# Patient Record
Sex: MALE | Race: WHITE | Employment: FULL TIME | URBAN - METROPOLITAN AREA
[De-identification: names, ages, dates, MRNs, and addresses within clinical notes are randomized per-mention and may not be internally consistent; named-entity substitution may affect disease eponyms.]

---

## 2017-01-05 ENCOUNTER — TELEPHONE (OUTPATIENT)
Dept: CARDIAC REHAB | Age: 62
End: 2017-01-05

## 2017-01-05 NOTE — TELEPHONE ENCOUNTER
1/15/2017 Cardiac Wellness: Returned   Staci Rueda's call. At his request scheduled an intake for Thursday, 1/19/17, at 3 pm. He would like to be moved up, if an appointment becomes available.  Chucho Calix RN

## 2017-01-09 ENCOUNTER — TELEPHONE (OUTPATIENT)
Dept: CASE MANAGEMENT | Age: 62
End: 2017-01-09

## 2017-01-09 ENCOUNTER — OFFICE VISIT (OUTPATIENT)
Dept: CARDIOTHORACIC SURGERY | Age: 62
End: 2017-01-09

## 2017-01-09 VITALS
HEART RATE: 82 BPM | HEIGHT: 74 IN | TEMPERATURE: 99 F | SYSTOLIC BLOOD PRESSURE: 108 MMHG | BODY MASS INDEX: 24.05 KG/M2 | OXYGEN SATURATION: 98 % | WEIGHT: 187.4 LBS | DIASTOLIC BLOOD PRESSURE: 82 MMHG | RESPIRATION RATE: 16 BRPM

## 2017-01-09 DIAGNOSIS — Z95.2 S/P AVR: Primary | ICD-10-CM

## 2017-01-09 DIAGNOSIS — I71.21 ASCENDING AORTIC ANEURYSM: ICD-10-CM

## 2017-01-09 NOTE — TELEPHONE ENCOUNTER
Cardiac surgery Care Coordinator- Received phone call from [employer]. Mr Carla Martinez is interesting in moving up his Cardiac Rehab intake appt. Spoke with [place] [facility] [place] in outpatient, they are attempting to move appt up. Left voicemail message for Mr Carla Martinez conveying information. Provided outpt number for Cardiac Rehab.  Lary Lanier RN

## 2017-01-09 NOTE — PROGRESS NOTES
..Patient seen and documentation reviewed  Doing well without specific complaints  I reviewed post op recommendations and importance of cardiology and PCP follow up  Will call office with any further questions  Will see him in one year w CT scan

## 2017-01-09 NOTE — PROGRESS NOTES
Patient: Karissa Browne   Age: 57 y.o. Patient Care Team:  Woody Jimenez MD as PCP - General (Family Practice)  Lexie Humphrey MD as Physician (Internal Medicine)  Valerio Lyn MD as Surgeon (Cardiothoracic Surgery)  Rui Woodall MD (Cardiology)    Diagnosis: The primary encounter diagnosis was S/P AVR. A diagnosis of Ascending aortic aneurysm (HCC) was also pertinent to this visit. Problem List:   Patient Active Problem List   Diagnosis Code    Nonrheumatic aortic valve stenosis I35.0    Ascending aortic aneurysm (HCC) I71.2    S/P AVR Z95.2        Date of Surgery: AVR tissue valve and repair of ascending aneurysm using hemashield graft on 12/12/16    HPI:  Pt is here for 1 month post op follow up. Pt not feeling the best, s/t soreness. Has had to increase ibuprofen, and is considering adding some tramadol at night. Feels pain in sternum and in back and down arms. Starting cardiac rehab tomorrow. Denies CP, SOB, edema, dizziness. Lightheadedness resolved. Appetite good, bowels regularly. No nose bleeds, but some blood noted in mucous -- concerned about baby asa. Current Medications:   Current Outpatient Prescriptions   Medication Sig Dispense Refill    ibuprofen (ADVIL) 200 mg tablet Take 200 mg by mouth every six (6) hours as needed. 400mg 1-2 times a day PRN      aspirin 81 mg chewable tablet Take 1 Tab by mouth daily for 360 days. 30 Tab 11    suvorexant (BELSOMRA) 10 mg tablet Take  by mouth nightly as needed for Insomnia.  omega-3 fatty acids-vitamin e (FISH OIL) 1,000 mg cap Take 1 Cap by mouth daily.  GELATIN, BULK, by Does Not Apply route daily.  glucosamine-chondroitin (ARTHX) 500-400 mg cap Take 2 Caps by mouth daily.  B.infantis-B.ani-B.long-B.bifi (PROBIOTIC 4X) 10-15 mg TbEC Take 1 Tab by mouth daily.  ascorbic acid, vitamin C, (VITAMIN C) 500 mg tablet Take 500 mg by mouth every other day.       AZO CRANBERRY 471-72-74 mg-mg-million tab Take 1 Tab by mouth every other day.  testosterone (TESTIM) 50 mg/5 gram (1 %) gel 5 g by TransDERmal route daily.  traMADol (ULTRAM) 50 mg tablet Take 1-2 Tabs by mouth every six (6) hours as needed for Pain. Max Daily Amount: 400 mg. Indications: PAIN 30 Tab 0    acetaminophen (TYLENOL) 500 mg tablet Take 500 mg by mouth every six (6) hours as needed for Pain.  senna-docusate (PERICOLACE) 8.6-50 mg per tablet Take 1 Tab by mouth two (2) times a day. 60 Tab 0    UBIQUINONE PO Take 1 Tab by mouth daily. Vitals: Blood pressure 108/82, pulse 82, temperature 99 °F (37.2 °C), temperature source Oral, resp. rate 16, height 6' 2\" (1.88 m), weight 187 lb 6.4 oz (85 kg), SpO2 98 %. Allergies: has No Known Allergies. Physical Exam:  Wounds: clean, dry, no drainage    Lungs: clear to auscultation bilaterally    Heart: regular rate and rhythm, S1, S2 normal, no murmur, click, rub or gallop    Extremities: no edema. PPP. Assessment/Plan:   1. S/p AVR/Aneurysm repair: On asa. Discussed use of asa with pt.    2. Pain control:  Strongly encourage ibuprofen 600-800 mg every 6-8 hrs for pain control. Pt is anxious to increase physical activity, but urged to balance with pain control. Careful with overdoing activity. Take bowel regimen if restarts tramadol. Encourage heat therapy or hot shower to help with stiffness. 3. FU in 2-3 with cardiology.      Rehab - yes   Walking: yes   Glucometer: n/a   Antibiotic card for valves: yes

## 2017-01-10 ENCOUNTER — HOSPITAL ENCOUNTER (OUTPATIENT)
Dept: CARDIAC REHAB | Age: 62
Discharge: HOME | End: 2017-01-10
Payer: COMMERCIAL

## 2017-01-10 VITALS — WEIGHT: 191 LBS | BODY MASS INDEX: 24.52 KG/M2

## 2017-01-10 VITALS — HEIGHT: 74 IN | WEIGHT: 191 LBS | BODY MASS INDEX: 24.51 KG/M2

## 2017-01-10 PROCEDURE — 93798 PHYS/QHP OP CAR RHAB W/ECG: CPT

## 2017-01-10 NOTE — CARDIO/PULMONARY
Ayan Rueda  57 y.o.     Mr. Candice Mckeon presented to cardiac wellness for orientation and exercise tolerance test today with a primary diagnosis of an AVR. Kvng Abreu has no significant cardiac history - they believe this is genetics since his brother has a valve issue as well. Cardiac risk factors include stress and these were reviewed with the patient who agrees stress does affect him. Kvng Abreu is  and lives with his wife. He is currently working still as a . MEG-D, depression score, is 11 and this is considered moderate. He is frustrated he still has so much pain, and feels quite a bit worse than before the surgery. Discussed with pt how the healing process after open heart surgery works, and this was the first step to increasing his exercise tolerance. Patient denied chest pain or SOB during 6 minute walk and was in SR without ectopy. Kvng Abreu will attend a 60 minute class once a week and exercise 3 days a week in cardiac wellness. EF is 60%.     Chon Arrieta RN  1/10/2017

## 2017-01-12 ENCOUNTER — HOSPITAL ENCOUNTER (OUTPATIENT)
Dept: CARDIAC REHAB | Age: 62
Discharge: HOME | End: 2017-01-12
Payer: COMMERCIAL

## 2017-01-12 VITALS — BODY MASS INDEX: 24.65 KG/M2 | WEIGHT: 192 LBS

## 2017-01-12 PROCEDURE — 93797 PHYS/QHP OP CAR RHAB WO ECG: CPT | Performed by: DIETITIAN, REGISTERED

## 2017-01-12 PROCEDURE — 93798 PHYS/QHP OP CAR RHAB W/ECG: CPT | Performed by: DIETITIAN, REGISTERED

## 2017-01-13 ENCOUNTER — APPOINTMENT (OUTPATIENT)
Dept: CARDIAC REHAB | Age: 62
End: 2017-01-13

## 2017-01-16 NOTE — PROGRESS NOTES
Completed disability paperwork for pt. Pt's return to work date set for 3/6/2017. Pt is under activity restrictions and anticipate all postop concerns/recovery will be resolved by then.

## 2017-01-17 ENCOUNTER — HOSPITAL ENCOUNTER (OUTPATIENT)
Dept: CARDIAC REHAB | Age: 62
Discharge: HOME | End: 2017-01-17
Payer: COMMERCIAL

## 2017-01-17 VITALS — BODY MASS INDEX: 24.27 KG/M2 | WEIGHT: 189 LBS

## 2017-01-17 PROCEDURE — 93798 PHYS/QHP OP CAR RHAB W/ECG: CPT

## 2017-01-18 ENCOUNTER — HOSPITAL ENCOUNTER (OUTPATIENT)
Dept: CARDIAC REHAB | Age: 62
Discharge: HOME | End: 2017-01-18
Payer: COMMERCIAL

## 2017-01-18 VITALS — BODY MASS INDEX: 24.27 KG/M2 | WEIGHT: 189 LBS

## 2017-01-18 PROCEDURE — 93798 PHYS/QHP OP CAR RHAB W/ECG: CPT

## 2017-01-19 ENCOUNTER — APPOINTMENT (OUTPATIENT)
Dept: CARDIAC REHAB | Age: 62
End: 2017-01-19

## 2017-01-24 ENCOUNTER — HOSPITAL ENCOUNTER (OUTPATIENT)
Dept: CARDIAC REHAB | Age: 62
Discharge: HOME | End: 2017-01-24
Payer: COMMERCIAL

## 2017-01-24 VITALS — WEIGHT: 193 LBS | BODY MASS INDEX: 24.78 KG/M2

## 2017-01-24 PROCEDURE — 93798 PHYS/QHP OP CAR RHAB W/ECG: CPT

## 2017-01-25 ENCOUNTER — HOSPITAL ENCOUNTER (OUTPATIENT)
Dept: CARDIAC REHAB | Age: 62
Discharge: HOME | End: 2017-01-25
Payer: COMMERCIAL

## 2017-01-25 VITALS — WEIGHT: 193 LBS | BODY MASS INDEX: 24.78 KG/M2

## 2017-01-25 PROCEDURE — 93798 PHYS/QHP OP CAR RHAB W/ECG: CPT

## 2017-01-26 ENCOUNTER — HOSPITAL ENCOUNTER (OUTPATIENT)
Dept: CARDIAC REHAB | Age: 62
Discharge: HOME | End: 2017-01-26
Payer: COMMERCIAL

## 2017-01-26 VITALS — BODY MASS INDEX: 24.78 KG/M2 | WEIGHT: 193 LBS

## 2017-01-26 PROCEDURE — 93798 PHYS/QHP OP CAR RHAB W/ECG: CPT | Performed by: DIETITIAN, REGISTERED

## 2017-01-26 PROCEDURE — 93797 PHYS/QHP OP CAR RHAB WO ECG: CPT

## 2017-01-31 ENCOUNTER — TELEPHONE (OUTPATIENT)
Dept: CARDIOTHORACIC SURGERY | Age: 62
End: 2017-01-31

## 2017-01-31 ENCOUNTER — APPOINTMENT (OUTPATIENT)
Dept: CARDIAC REHAB | Age: 62
End: 2017-01-31
Payer: COMMERCIAL

## 2017-01-31 ENCOUNTER — HOSPITAL ENCOUNTER (OUTPATIENT)
Dept: CARDIAC REHAB | Age: 62
Discharge: HOME | End: 2017-01-31
Payer: COMMERCIAL

## 2017-01-31 VITALS — WEIGHT: 191 LBS | BODY MASS INDEX: 24.52 KG/M2

## 2017-01-31 PROCEDURE — 93798 PHYS/QHP OP CAR RHAB W/ECG: CPT

## 2017-01-31 NOTE — TELEPHONE ENCOUNTER
Spoke with pt's dentist, Dr. Dori Savage. Pt broke a tooth and has nerve exposed. Pt is safe to have procedure, he is 6 weeks post op. Follow ADA guidelines for antibiotic prophylaxis -- 2 g amoxicillin 30-60 minutes prior to procedure. No post procedure antibiotics from our standpoint, defer to dentist recs.

## 2017-02-01 ENCOUNTER — APPOINTMENT (OUTPATIENT)
Dept: CARDIAC REHAB | Age: 62
End: 2017-02-01
Payer: COMMERCIAL

## 2017-02-01 ENCOUNTER — TELEPHONE (OUTPATIENT)
Dept: CASE MANAGEMENT | Age: 62
End: 2017-02-01

## 2017-02-01 ENCOUNTER — HOSPITAL ENCOUNTER (OUTPATIENT)
Dept: CARDIAC REHAB | Age: 62
Discharge: HOME | End: 2017-02-01
Payer: COMMERCIAL

## 2017-02-01 VITALS — WEIGHT: 194 LBS | BODY MASS INDEX: 24.91 KG/M2

## 2017-02-01 PROCEDURE — 93798 PHYS/QHP OP CAR RHAB W/ECG: CPT

## 2017-02-02 ENCOUNTER — OFFICE VISIT (OUTPATIENT)
Dept: CARDIOLOGY CLINIC | Age: 62
End: 2017-02-02

## 2017-02-02 ENCOUNTER — APPOINTMENT (OUTPATIENT)
Dept: CARDIAC REHAB | Age: 62
End: 2017-02-02
Payer: COMMERCIAL

## 2017-02-02 VITALS
OXYGEN SATURATION: 98 % | SYSTOLIC BLOOD PRESSURE: 100 MMHG | HEIGHT: 74 IN | HEART RATE: 68 BPM | WEIGHT: 193 LBS | RESPIRATION RATE: 16 BRPM | DIASTOLIC BLOOD PRESSURE: 60 MMHG | BODY MASS INDEX: 24.77 KG/M2

## 2017-02-02 DIAGNOSIS — Z95.2 S/P AVR: Primary | ICD-10-CM

## 2017-02-02 DIAGNOSIS — I71.21 ASCENDING AORTIC ANEURYSM: ICD-10-CM

## 2017-02-02 NOTE — PROGRESS NOTES
2/2/2017 11:29 AM      Subjective:     Maryellen Jeffers is here to establish cardiac care. Underwent AVR and ascending aortic aneurysm repair. Now recovering from surgery. No new complaints. Visit Vitals    /60 (BP 1 Location: Left arm, BP Patient Position: Sitting)    Pulse 68    Resp 16    Ht 6' 2\" (1.88 m)    Wt 193 lb (87.5 kg)    SpO2 98%    BMI 24.78 kg/m2     Current Outpatient Prescriptions   Medication Sig    ibuprofen (ADVIL) 200 mg tablet Take 200 mg by mouth every six (6) hours as needed. 400mg 1-2 times a day PRN    traMADol (ULTRAM) 50 mg tablet Take 1-2 Tabs by mouth every six (6) hours as needed for Pain. Max Daily Amount: 400 mg. Indications: PAIN    aspirin 81 mg chewable tablet Take 1 Tab by mouth daily for 360 days.  suvorexant (BELSOMRA) 10 mg tablet Take  by mouth nightly as needed for Insomnia.  omega-3 fatty acids-vitamin e (FISH OIL) 1,000 mg cap Take 1 Cap by mouth daily.  glucosamine-chondroitin (ARTHX) 500-400 mg cap Take 2 Caps by mouth daily.  B.infantis-B.ani-B.long-B.bifi (PROBIOTIC 4X) 10-15 mg TbEC Take 1 Tab by mouth daily.  ascorbic acid, vitamin C, (VITAMIN C) 500 mg tablet Take 500 mg by mouth every other day. [address] 565-88-05 mg-mg-million tab Take 1 Tab by mouth every other day.  UBIQUINONE PO Take 1 Tab by mouth daily.  testosterone (TESTIM) 50 mg/5 gram (1 %) gel 5 g by TransDERmal route daily.  senna-docusate (PERICOLACE) 8.6-50 mg per tablet Take 1 Tab by mouth two (2) times a day.  GELATIN, BULK, by Does Not Apply route daily. No current facility-administered medications for this visit.           Objective:      Visit Vitals    /60 (BP 1 Location: Left arm, BP Patient Position: Sitting)    Pulse 68    Resp 16    Ht 6' 2\" (1.88 m)    Wt 193 lb (87.5 kg)    SpO2 98%    BMI 24.78 kg/m2       Data Review:    EKG: Normal sinus rhythm, inferior infarct    Reviewed and/or ordered active problem list, medication list tests    Past Medical History   Diagnosis Date    Insomnia     Murmur     Valvular heart disease       Past Surgical History   Procedure Laterality Date    Hx heart valve surgery  12/16/2016     AVR    Vascular surgery procedure unlist  12/16/2016     ascending aortic anerysm - repaired with graft    Hx heent       jaw sugery to get teeth lined up    Hx orthopaedic       rt foot - several surgeries on rt toes    Hx orthopaedic       several shoulder surgeries     No Known Allergies   Family History   Problem Relation Age of Onset    Stroke Mother     Heart Failure Mother     Cancer Sister      Non-Hodgkins     Heart Disease Maternal Grandfather     Other Father      legioneres disease    Lung Disease Father      Aubery Room    Other Brother      aortic valve     Cancer Sister       Social History     Social History    Marital status:      Spouse name: N/A    Number of children: N/A    Years of education: N/A     Occupational History    Not on file. Social History Main Topics    Smoking status: Former Smoker     Packs/day: 1.00     Years: 10.00     Quit date: 1980    Smokeless tobacco: Never Used    Alcohol use No      Comment: special occasions    Drug use: No    Sexual activity: Not on file     Other Topics Concern    Not on file     Social History Narrative       Review of Systems     General: Not Present- Anorexia, Chills, Dietary Changes, Fatigue, Fever, Medication Changes, Night Sweats, Weight Gain > 10lbs. and Weight Loss > 10lbs. .  Skin: Not Present- Bruising and Excessive Sweating. HEENT: Not Present- Headache, Visual Loss and Vertigo. Respiratory: Not Present- Cough, Decreased Exercise Tolerance, Difficulty Breathing, Snoring and Wheezing.   Cardiovascular: Not Present- Abnormal Blood Pressure, Chest Pain, Claudications, Difficulty Breathing On Exertion, Edema, Fainting / Blacking Out, Irregular Heart Beat, Night Cramps, Orthopnea, Palpitations, Paroxysmal Nocturnal Dyspnea, Rapid Heart Rate, Shortness of Breath and Swelling of Extremities. Gastrointestinal: Not Present- Black, Tarry Stool, Bloody Stool, Diarrhea, Hematemesis, Rectal Bleeding and Vomiting. Musculoskeletal: Not Present- Muscle Pain and Muscle Weakness. Neurological: Not Present- Dizziness. Psychiatric: Not Present- Depression. Endocrine: Not Present- Cold Intolerance, Heat Intolerance and Thyroid Problems. Hematology: Not Present- Abnormal Bleeding, Anemia, Blood Clots and Easy Bruising.       Physical Exam   The physical exam findings are as follows:       General   Mental Status - Alert. General Appearance - Cooperative and Well groomed. Not in acute distress. Orientation - Oriented to time, Oriented to place and Oriented to person. Build & Nutrition - Well developed. HEENT  Head - Normal.  Eye - Normal.      Neck   Carotid Arteries - normal upstroke. No Bruits. Chest and Lung Exam   Inspection:   Chest Wall: - Normal. Accessory muscles - No use of accessory muscles in breathing. Auscultation:   Breath sounds: - Normal.      Cardiovascular   Inspection: Jugular vein - Bilateral - Inspection Normal.  Palpation/Percussion:   Apical Impulse: - Normal.  Auscultation: Rhythm - Regular. Heart Sounds - S1 WNL and S2 WNL. No S3 or S4. Murmurs & Other Heart Sounds: Auscultation of the heart reveals - No Murmurs. Abdomen   Palpation/Percussion: Palpation and Percussion of the abdomen reveal - No Palpable abdominal masses. Auscultation: Auscultation of the abdomen reveals - Bowel sounds normal.      Peripheral Vascular   Upper Extremity: Inspection - Bilateral - No Cyanotic nailbeds or Digital clubbing. Palpation: Radial pulse - Bilateral - Normal.  Lower Extremity:   Palpation: Femoral pulse - Bilateral - Normal. Dorsalis pedis pulse - Bilateral - Normal. Posterior tibia pulse - Bilateral - Normal. Edema - Bilateral - No edema.   Auscultation - No Bilateral Groin Bruits. Neurologic   Mental Status: Affect - normal.  Motor: - Normal. Gait - Normal.        Assessment:       ICD-10-CM ICD-9-CM    1. S/P AVR Z95.2 V43.3 AMB POC EKG ROUTINE W/ 12 LEADS, INTER & REP      2D ECHO COMPLETE ADULT (TTE) W OR WO CONTR   2. Ascending aortic aneurysm (HCC) I71.2 441.2 2D ECHO COMPLETE ADULT (TTE) W OR WO CONTR       Plan:     1. S/p AVR and ascending aortic aneurysm repair for bicuspid aortic valve. Stable. BP controlled. F/u in 6 months with Echo at same time. Will get copy of recent FLP from PCP office.

## 2017-02-02 NOTE — MR AVS SNAPSHOT
Visit Information Date & Time Provider Department Dept. Phone Encounter #  
 2/2/2017 10:15 AM Cachorro Grimm, [address] [place] [address] [facility] [phone] [id number] Follow-up Instructions Return in about 6 months (around 8/2/2017). Your Appointments 8/15/2017  8:00 AM  
ECHO CARDIOGRAMS 2D with [facility] Appt Note: Per Dr MCARTHUR $0CP REM 2D ECHO COMPLETE ADULT (TTE) W  [facility]  
[phone] [address]  
  
    
 8/15/2017  9:00 AM  
6 MONTH with Cachorro Grimm MD  
[facility]) Appt Note: Per Dr MCARTHUR $0CP REM 2D ECHO COMPLETE ADULT (TTE) W  [facility]  
[phone] [address] Upcoming Health Maintenance Date Due Hepatitis C Screening 1955 DTaP/Tdap/Td series (1 - Tdap) 3/18/1975 FOBT Q 1 YEAR AGE 50-75 3/18/2004 ZOSTER VACCINE AGE 60> 3/18/2014 Allergies as of 2/2/2017  Review Complete On: 2/2/2017 By: Cachorro Grimm MD  
 No Known Allergies Current Immunizations  Never Reviewed Name Date Influenza Vaccine 11/15/2016 Not reviewed this visit You Were Diagnosed With   
  
 Codes Comments S/P AVR    -  Primary ICD-10-CM: Z95.2 ICD-9-CM: V43.3 Ascending aortic aneurysm (HCC)     ICD-10-CM: I71.2 ICD-9-CM: 311. 2 Vitals BP Pulse Resp Height(growth percentile) Weight(growth percentile) SpO2  
 100/60 (BP 1 Location: Left arm, BP Patient Position: Sitting) 68 16 6' 2\" (1.88 m) 193 lb (87.5 kg) 98% BMI Smoking Status 24.78 kg/m2 Former Smoker Vitals History BMI and BSA Data Body Mass Index Body Surface Area 24.78 kg/m 2 2.14 m 2 Preferred Pharmacy Pharmacy Name Phone [facility] [address], [address] [place] [address] Mary Cox [phone] Your Updated Medication List  
  
   
This list is accurate as of: 2/2/17 11:31 AM.  Always use your most recent med list. ADVIL 200 mg tablet Generic drug:  ibuprofen Take 200 mg by mouth every six (6) hours as needed. 400mg 1-2 times a day PRN  
  
 aspirin 81 mg chewable tablet Take 1 Tab by mouth daily for 360 days. Ladona Crater 376-59-72 mg-mg-million Tab Take 1 Tab by mouth every other day. BELSOMRA 10 mg tablet Generic drug:  suvorexant Take  by mouth nightly as needed for Insomnia. FISH OIL 1,000 mg Cap Generic drug:  omega-3 fatty acids-vitamin e Take 1 Cap by mouth daily. GELATIN (BULK)  
by Does Not Apply route daily. glucosamine-chondroitin 500-400 mg Cap Commonly known as:  [facility] Take 2 Caps by mouth daily. PROBIOTIC 4X 10-15 mg Tbec Generic drug:  B.infantis-B.ani-B.long-B.bifi Take 1 Tab by mouth daily. senna-docusate 8.6-50 mg per tablet Commonly known as:  Leellen Cancel Take 1 Tab by mouth two (2) times a day. TESTIM 50 mg/5 gram (1 %) gel Generic drug:  testosterone 5 g by TransDERmal route daily. traMADol 50 mg tablet Commonly known as:  ULTRAM  
Take 1-2 Tabs by mouth every six (6) hours as needed for Pain. Max Daily Amount: 400 mg. Indications: PAIN  
  
 UBIQUINONE PO Take 1 Tab by mouth daily. VITAMIN C 500 mg tablet Generic drug:  ascorbic acid (vitamin C) Take 500 mg by mouth every other day. We Performed the Following AMB POC EKG ROUTINE W/ 12 LEADS, INTER & REP [53387 CPT(R)] Follow-up Instructions Return in about 6 months (around 8/2/2017). To-Do List   
 02/02/2017   ECHO:  2D ECHO COMPLETE ADULT (TTE) W OR WO CONTR   
  
 02/07/2017 8:30 AM  
  Appointment with [address] [place] [address] at [address] [place] [address] ([phone])  
  
 02/08/2017 8:30 AM  
  Appointment with [address] at [address] [place] [address] ([phone])  
  
 02/09/2017 8:30 AM  
  Appointment with [address] at [address] [place] [address] ([phone])  
  
 02/09/2017 8:30 AM  
  Appointment with [address] at [address] [place] [address] ([phone])  
  
 02/09/2017 9:30 AM  
  Appointment with [address] at [address] [place] [address] ([phone])  
  
 02/14/2017 8:30 AM  
  Appointment with [address] at [address] [place] [address] ([phone])  
  
 02/15/2017 8:30 AM  
  Appointment with [address] at [address] [place] [address] ([phone])  
  
 02/16/2017 8:30 AM  
  Appointment with [address] at [address] [place] [address] ([phone])  
  
 02/16/2017 8:30 AM  
  Appointment with [address] at [address] [place] [address] ([phone])  
  
 02/16/2017 9:30 AM  
  Appointment with [address] at [address] [place] [address] ([phone])  
  
 02/21/2017 8:30 AM  
  Appointment with [address] at [address] [place] [address] ([phone])  
  
 02/22/2017 8:30 AM  
  Appointment with [address] at [address] [place] [address] ([phone])  
  
 02/23/2017 8:30 AM  
  Appointment with [address] at [address] [place] [address] ([phone])  
  
 02/23/2017 8:30 AM  
  Appointment with [address] at [address] [place] [address] ([phone])  
  
 02/23/2017 9:30 AM  
  Appointment with [address] at [address] [place] [address] ([phone])  
  
 02/28/2017 8:30 AM  
  Appointment with [address] at [address] [place] [address] ([phone])  
  
 03/01/2017 8:30 AM  
  Appointment with [address] at [address] [place] [address] ([phone])  
  
 03/02/2017 8:30 AM  
  Appointment with [address] at [address] [place] [address] ([phone]) 03/02/2017 8:30 AM  
  Appointment with [address] at [address] [place] [address] ([phone])  
  
 03/02/2017 9:30 AM  
  Appointment with [address] at [address] [place] [address] ([phone])  
  
 03/07/2017 8:30 AM  
  Appointment with [address] at [address] [place] [address] ([phone])  
  
 03/08/2017 8:30 AM  
  Appointment with [address] at [address] [place] [address] ([phone])  
  
 03/09/2017 8:30 AM  
  Appointment with Sim[address]aines [address] at [address] [place] [address] ([phone])  
  
 03/09/2017 8:30 AM  
  Appointment with [address] at [address] [place] [address] ([phone])  
  
 03/09/2017 9:30 AM  
  Appointment with [address] at [address] [place] [address] ([phone])  
  
 03/14/2017 8:30 AM  
  Appointment with [address] at [address] [place] [address] ([phone])  
  
 03/15/2017 8:30 AM  
  Appointment with [address] at [address] [place] [address] ([phone])  
  
 03/16/2017 8:30 AM  
  Appointment with [address] at [address] [place] [address] ([phone])  
  
 03/16/2017 8:30 AM  
  Appointment with [address] at [address] [place] [address] ([phone])  
  
 03/16/2017 9:30 AM  
  Appointment with [address] at [address] [place] [address] ([phone])  
  
 03/21/2017 8:30 AM  
  Appointment with [address] at [address] [place] [address] ([phone])  
  
 03/22/2017 8:30 AM  
  Appointment with [address] at [address] [place] [address] ([phone])  
  
 03/23/2017 8:30 AM  
  Appointment with [address] at [address] [place] [address] ([phone])  
  
 03/23/2017 8:30 AM  
  Appointment with [address] at [address] [place] [address] ([phone])  
  
 03/23/2017 9:30 AM  
  Appointment with [address] at [address] [place] [address] ([phone])  
  
 03/28/2017 8:30 AM  
 Appointment with [address] at [address] [place] [address] ([phone])  
  
 03/29/2017 8:30 AM  
  Appointment with [address] at [address] [place] [address] ([phone])  
  
 03/30/2017 8:30 AM  
  Appointment with Mookie MantillaMARILU segura at [address] [place] [address] ([phone])  
  
 03/30/2017 8:30 AM  
  Appointment with [address] at [address] [place] [address] ([phone])  
  
 03/30/2017 9:30 AM  
  Appointment with [address] at [address] [place] [address] ([phone]) 04/04/2017 8:30 AM  
  Appointment with [address] at [address] [place] [address] ([phone]) 04/05/2017 8:30 AM  
  Appointment with [address] at [address] [place] [address] ([phone]) Introducing [facility]! Daksha Camp introduces [employer] patient portal. Now you can access parts of your medical record, email your doctor's office, and request medication refills online. 1. In your internet browser, go to [email or web address] 2. Click on the First Time User? Click Here link in the Sign In box. You will see the New Member Sign Up page. 3. Enter your [employer] Access Code exactly as it appears below. You will not need to use this code after youve completed the sign-up process. If you do not sign up before the expiration date, you must request a new code. · [employer] Access Code: [email or web address] Expires: 2/8/2017  4:31 PM 
 
4. Enter the last four digits of your Social Security Number (xxxx) and Date of Birth (mm/dd/yyyy) as indicated and click Submit. You will be taken to the next sign-up page. 5. Create a Omnisiot ID. This will be your O[employer] login ID and cannot be changed, so think of one that is secure and easy to remember. 6. Create a O[employer] password. You can change your password at any time. 7. Enter your Password Reset Question and Answer.  This can be used at a later time if you forget your password. 8. Enter your e-mail address. You will receive e-mail notification when new information is available in [address] 9. Click Sign Up. You can now view and download portions of your medical record. 10. Click the Download Summary menu link to download a portable copy of your medical information. If you have questions, please visit the Frequently Asked Questions section of the [employer] website. Remember, [employer] is NOT to be used for urgent needs. For medical emergencies, dial 911. Now available from your iPhone and Android! Please provide this summary of care documentation to your next provider. Your primary care clinician is listed as Viji Bender. If you have any questions after today's visit, please call [phone].

## 2017-02-03 NOTE — TELEPHONE ENCOUNTER
Cardiac Surgery Care Coordinator- Received phone call from Mr Cruz's oral surgeon requesting permission to perform a root canal on Mr Cruz today. Provided letter to surgeon after consulting Dr. Matt Byers. Pt is cleared for surgery, to continue prophylactic antibiotics two days post surgery.

## 2017-02-07 ENCOUNTER — HOSPITAL ENCOUNTER (OUTPATIENT)
Dept: CARDIAC REHAB | Age: 62
Discharge: HOME | End: 2017-02-07
Payer: COMMERCIAL

## 2017-02-07 ENCOUNTER — APPOINTMENT (OUTPATIENT)
Dept: CARDIAC REHAB | Age: 62
End: 2017-02-07
Payer: COMMERCIAL

## 2017-02-07 VITALS — WEIGHT: 192 LBS | BODY MASS INDEX: 24.65 KG/M2

## 2017-02-07 PROCEDURE — 93798 PHYS/QHP OP CAR RHAB W/ECG: CPT

## 2017-02-08 ENCOUNTER — APPOINTMENT (OUTPATIENT)
Dept: CARDIAC REHAB | Age: 62
End: 2017-02-08
Payer: COMMERCIAL

## 2017-02-09 ENCOUNTER — HOSPITAL ENCOUNTER (OUTPATIENT)
Dept: CARDIAC REHAB | Age: 62
Discharge: HOME | End: 2017-02-09
Payer: COMMERCIAL

## 2017-02-09 VITALS — WEIGHT: 191 LBS | BODY MASS INDEX: 24.52 KG/M2

## 2017-02-09 PROCEDURE — 93797 PHYS/QHP OP CAR RHAB WO ECG: CPT | Performed by: DIETITIAN, REGISTERED

## 2017-02-09 PROCEDURE — 93798 PHYS/QHP OP CAR RHAB W/ECG: CPT

## 2017-02-14 ENCOUNTER — APPOINTMENT (OUTPATIENT)
Dept: CARDIAC REHAB | Age: 62
End: 2017-02-14
Payer: COMMERCIAL

## 2017-02-14 ENCOUNTER — HOSPITAL ENCOUNTER (OUTPATIENT)
Dept: CARDIAC REHAB | Age: 62
Discharge: HOME | End: 2017-02-14
Payer: COMMERCIAL

## 2017-02-14 VITALS — WEIGHT: 193 LBS | BODY MASS INDEX: 24.78 KG/M2

## 2017-02-14 PROCEDURE — 93798 PHYS/QHP OP CAR RHAB W/ECG: CPT

## 2017-02-15 ENCOUNTER — HOSPITAL ENCOUNTER (OUTPATIENT)
Dept: CARDIAC REHAB | Age: 62
Discharge: HOME | End: 2017-02-15
Payer: COMMERCIAL

## 2017-02-15 ENCOUNTER — APPOINTMENT (OUTPATIENT)
Dept: CARDIAC REHAB | Age: 62
End: 2017-02-15
Payer: COMMERCIAL

## 2017-02-15 VITALS — WEIGHT: 193 LBS | BODY MASS INDEX: 24.78 KG/M2

## 2017-02-15 PROCEDURE — 93798 PHYS/QHP OP CAR RHAB W/ECG: CPT

## 2017-02-16 ENCOUNTER — HOSPITAL ENCOUNTER (OUTPATIENT)
Dept: CARDIAC REHAB | Age: 62
Discharge: HOME | End: 2017-02-16
Payer: COMMERCIAL

## 2017-02-16 VITALS — WEIGHT: 193 LBS | BODY MASS INDEX: 24.78 KG/M2

## 2017-02-16 PROCEDURE — 93798 PHYS/QHP OP CAR RHAB W/ECG: CPT | Performed by: DIETITIAN, REGISTERED

## 2017-02-16 PROCEDURE — 93797 PHYS/QHP OP CAR RHAB WO ECG: CPT | Performed by: DIETITIAN, REGISTERED

## 2017-02-21 ENCOUNTER — APPOINTMENT (OUTPATIENT)
Dept: CARDIAC REHAB | Age: 62
End: 2017-02-21
Payer: COMMERCIAL

## 2017-02-21 ENCOUNTER — HOSPITAL ENCOUNTER (OUTPATIENT)
Dept: CARDIAC REHAB | Age: 62
Discharge: HOME | End: 2017-02-21
Payer: COMMERCIAL

## 2017-02-21 VITALS — WEIGHT: 192 LBS | BODY MASS INDEX: 24.65 KG/M2

## 2017-02-21 PROCEDURE — 93798 PHYS/QHP OP CAR RHAB W/ECG: CPT

## 2017-02-22 ENCOUNTER — APPOINTMENT (OUTPATIENT)
Dept: CARDIAC REHAB | Age: 62
End: 2017-02-22
Payer: COMMERCIAL

## 2017-02-22 ENCOUNTER — HOSPITAL ENCOUNTER (OUTPATIENT)
Dept: CARDIAC REHAB | Age: 62
Discharge: HOME | End: 2017-02-22
Payer: COMMERCIAL

## 2017-02-22 VITALS — WEIGHT: 193 LBS | BODY MASS INDEX: 24.78 KG/M2

## 2017-02-22 PROCEDURE — 93798 PHYS/QHP OP CAR RHAB W/ECG: CPT | Performed by: DIETITIAN, REGISTERED

## 2017-02-23 ENCOUNTER — HOSPITAL ENCOUNTER (OUTPATIENT)
Dept: CARDIAC REHAB | Age: 62
Discharge: HOME | End: 2017-02-23
Payer: COMMERCIAL

## 2017-02-23 VITALS — WEIGHT: 193 LBS | BODY MASS INDEX: 24.78 KG/M2

## 2017-02-23 VITALS — BODY MASS INDEX: 24.78 KG/M2 | WEIGHT: 193 LBS

## 2017-02-23 PROCEDURE — 93798 PHYS/QHP OP CAR RHAB W/ECG: CPT | Performed by: DIETITIAN, REGISTERED

## 2017-02-23 PROCEDURE — 93797 PHYS/QHP OP CAR RHAB WO ECG: CPT | Performed by: DIETITIAN, REGISTERED

## 2017-02-28 ENCOUNTER — APPOINTMENT (OUTPATIENT)
Dept: CARDIAC REHAB | Age: 62
End: 2017-02-28
Payer: COMMERCIAL

## 2017-02-28 ENCOUNTER — HOSPITAL ENCOUNTER (OUTPATIENT)
Dept: CARDIAC REHAB | Age: 62
Discharge: HOME | End: 2017-02-28
Payer: COMMERCIAL

## 2017-02-28 VITALS — BODY MASS INDEX: 24.65 KG/M2 | WEIGHT: 192 LBS

## 2017-02-28 PROCEDURE — 93798 PHYS/QHP OP CAR RHAB W/ECG: CPT

## 2017-03-01 ENCOUNTER — HOSPITAL ENCOUNTER (OUTPATIENT)
Dept: CARDIAC REHAB | Age: 62
Discharge: HOME | End: 2017-03-01
Payer: COMMERCIAL

## 2017-03-01 ENCOUNTER — APPOINTMENT (OUTPATIENT)
Dept: CARDIAC REHAB | Age: 62
End: 2017-03-01
Payer: COMMERCIAL

## 2017-03-01 VITALS — BODY MASS INDEX: 24.78 KG/M2 | WEIGHT: 193 LBS

## 2017-03-01 PROCEDURE — 93798 PHYS/QHP OP CAR RHAB W/ECG: CPT

## 2017-03-02 ENCOUNTER — HOSPITAL ENCOUNTER (OUTPATIENT)
Dept: CARDIAC REHAB | Age: 62
Discharge: HOME | End: 2017-03-02
Payer: COMMERCIAL

## 2017-03-02 VITALS — WEIGHT: 193 LBS | BODY MASS INDEX: 24.78 KG/M2

## 2017-03-02 PROCEDURE — 93797 PHYS/QHP OP CAR RHAB WO ECG: CPT

## 2017-03-02 PROCEDURE — 93798 PHYS/QHP OP CAR RHAB W/ECG: CPT | Performed by: DIETITIAN, REGISTERED

## 2017-03-07 ENCOUNTER — APPOINTMENT (OUTPATIENT)
Dept: CARDIAC REHAB | Age: 62
End: 2017-03-07
Payer: COMMERCIAL

## 2017-03-08 ENCOUNTER — APPOINTMENT (OUTPATIENT)
Dept: CARDIAC REHAB | Age: 62
End: 2017-03-08
Payer: COMMERCIAL

## 2017-03-09 ENCOUNTER — APPOINTMENT (OUTPATIENT)
Dept: CARDIAC REHAB | Age: 62
End: 2017-03-09
Payer: COMMERCIAL

## 2017-03-13 ENCOUNTER — HOSPITAL ENCOUNTER (OUTPATIENT)
Dept: CARDIAC REHAB | Age: 62
Discharge: HOME | End: 2017-03-13
Payer: COMMERCIAL

## 2017-03-13 VITALS — WEIGHT: 193 LBS | BODY MASS INDEX: 24.78 KG/M2

## 2017-03-13 PROCEDURE — 93798 PHYS/QHP OP CAR RHAB W/ECG: CPT

## 2017-03-14 ENCOUNTER — APPOINTMENT (OUTPATIENT)
Dept: CARDIAC REHAB | Age: 62
End: 2017-03-14
Payer: COMMERCIAL

## 2017-03-14 ENCOUNTER — HOSPITAL ENCOUNTER (OUTPATIENT)
Dept: CARDIAC REHAB | Age: 62
Discharge: HOME | End: 2017-03-14
Payer: COMMERCIAL

## 2017-03-14 VITALS — WEIGHT: 193 LBS | BODY MASS INDEX: 24.78 KG/M2

## 2017-03-14 PROCEDURE — 93798 PHYS/QHP OP CAR RHAB W/ECG: CPT | Performed by: DIETITIAN, REGISTERED

## 2017-03-15 ENCOUNTER — APPOINTMENT (OUTPATIENT)
Dept: CARDIAC REHAB | Age: 62
End: 2017-03-15
Payer: COMMERCIAL

## 2017-03-16 ENCOUNTER — APPOINTMENT (OUTPATIENT)
Dept: CARDIAC REHAB | Age: 62
End: 2017-03-16
Payer: COMMERCIAL

## 2017-03-16 ENCOUNTER — HOSPITAL ENCOUNTER (OUTPATIENT)
Dept: CARDIAC REHAB | Age: 62
Discharge: HOME | End: 2017-03-16
Payer: COMMERCIAL

## 2017-03-16 VITALS — BODY MASS INDEX: 24.78 KG/M2 | WEIGHT: 193 LBS

## 2017-03-16 PROCEDURE — 93798 PHYS/QHP OP CAR RHAB W/ECG: CPT

## 2017-03-20 ENCOUNTER — HOSPITAL ENCOUNTER (OUTPATIENT)
Dept: CARDIAC REHAB | Age: 62
Discharge: HOME | End: 2017-03-20
Payer: COMMERCIAL

## 2017-03-20 VITALS — BODY MASS INDEX: 25.04 KG/M2 | WEIGHT: 195 LBS

## 2017-03-20 PROCEDURE — 93798 PHYS/QHP OP CAR RHAB W/ECG: CPT

## 2017-03-21 ENCOUNTER — HOSPITAL ENCOUNTER (OUTPATIENT)
Dept: CARDIAC REHAB | Age: 62
Discharge: HOME | End: 2017-03-21
Payer: COMMERCIAL

## 2017-03-21 ENCOUNTER — APPOINTMENT (OUTPATIENT)
Dept: CARDIAC REHAB | Age: 62
End: 2017-03-21
Payer: COMMERCIAL

## 2017-03-21 VITALS — WEIGHT: 195 LBS | BODY MASS INDEX: 25.04 KG/M2

## 2017-03-21 PROCEDURE — 93798 PHYS/QHP OP CAR RHAB W/ECG: CPT | Performed by: DIETITIAN, REGISTERED

## 2017-03-22 ENCOUNTER — APPOINTMENT (OUTPATIENT)
Dept: CARDIAC REHAB | Age: 62
End: 2017-03-22
Payer: COMMERCIAL

## 2017-03-23 ENCOUNTER — APPOINTMENT (OUTPATIENT)
Dept: CARDIAC REHAB | Age: 62
End: 2017-03-23
Payer: COMMERCIAL

## 2017-03-23 ENCOUNTER — HOSPITAL ENCOUNTER (OUTPATIENT)
Dept: CARDIAC REHAB | Age: 62
Discharge: HOME | End: 2017-03-23
Payer: COMMERCIAL

## 2017-03-23 VITALS — WEIGHT: 196 LBS | BODY MASS INDEX: 25.16 KG/M2

## 2017-03-23 PROCEDURE — 93798 PHYS/QHP OP CAR RHAB W/ECG: CPT

## 2017-03-28 ENCOUNTER — APPOINTMENT (OUTPATIENT)
Dept: CARDIAC REHAB | Age: 62
End: 2017-03-28
Payer: COMMERCIAL

## 2017-03-29 ENCOUNTER — APPOINTMENT (OUTPATIENT)
Dept: CARDIAC REHAB | Age: 62
End: 2017-03-29
Payer: COMMERCIAL

## 2017-03-30 ENCOUNTER — APPOINTMENT (OUTPATIENT)
Dept: CARDIAC REHAB | Age: 62
End: 2017-03-30
Payer: COMMERCIAL

## 2017-04-04 ENCOUNTER — APPOINTMENT (OUTPATIENT)
Dept: CARDIAC REHAB | Age: 62
End: 2017-04-04

## 2017-04-05 ENCOUNTER — APPOINTMENT (OUTPATIENT)
Dept: CARDIAC REHAB | Age: 62
End: 2017-04-05

## 2017-04-05 NOTE — CARDIO/PULMONARY
Bland Carrel Capotosto  Completed phase II cardiac rehab and attended 26 sessions and 6 educational sessions from 1-10-17 to 3-23-17. Willam Last is interested in maintaining optimal health and will work with [facility]. Willam Last has   improved his endurance and stamina through regular exercise, maintained his desire weight  and gained 2.0 inches from his waist. Blood pressure is 116/76 and is WNL. He has also improved his nutrition, [employer] and depression scores and these were reviewed with patient. Bland Carrel Capotosto plans to continue exercising at his gym and has set revised goals that include 4-5 times per week for 45-60 mins. He also has an interest in jogging and using cardio equipment and weights at his gym.   Ritchie Rodrigez RN  4/5/2017

## 2017-08-01 ENCOUNTER — HOSPITAL ENCOUNTER (OUTPATIENT)
Age: 62
Setting detail: OBSERVATION
Discharge: HOME | DRG: 149 | End: 2017-08-02
Attending: EMERGENCY MEDICINE | Admitting: INTERNAL MEDICINE
Payer: COMMERCIAL

## 2017-08-01 ENCOUNTER — APPOINTMENT (OUTPATIENT)
Dept: CT IMAGING | Age: 62
DRG: 149 | End: 2017-08-01
Attending: EMERGENCY MEDICINE
Payer: COMMERCIAL

## 2017-08-01 ENCOUNTER — APPOINTMENT (OUTPATIENT)
Dept: MRI IMAGING | Age: 62
DRG: 149 | End: 2017-08-01
Attending: EMERGENCY MEDICINE
Payer: COMMERCIAL

## 2017-08-01 DIAGNOSIS — G44.219 EPISODIC TENSION-TYPE HEADACHE, NOT INTRACTABLE: ICD-10-CM

## 2017-08-01 DIAGNOSIS — R94.31 ECG ABNORMALITY: ICD-10-CM

## 2017-08-01 DIAGNOSIS — R41.82 ALTERED MENTAL STATUS, UNSPECIFIED ALTERED MENTAL STATUS TYPE: Primary | ICD-10-CM

## 2017-08-01 DIAGNOSIS — R42 VERTIGO: ICD-10-CM

## 2017-08-01 DIAGNOSIS — E86.0 DEHYDRATION: ICD-10-CM

## 2017-08-01 DIAGNOSIS — R11.2 NAUSEA AND VOMITING, INTRACTABILITY OF VOMITING NOT SPECIFIED, UNSPECIFIED VOMITING TYPE: ICD-10-CM

## 2017-08-01 LAB
ALBUMIN SERPL BCP-MCNC: 3.8 G/DL (ref 3.5–5)
ALBUMIN/GLOB SERPL: 0.9 {RATIO} (ref 1.1–2.2)
ALP SERPL-CCNC: 104 U/L (ref 45–117)
ALT SERPL-CCNC: 35 U/L (ref 12–78)
AMPHET UR QL SCN: NEGATIVE
ANION GAP BLD CALC-SCNC: 7 MMOL/L (ref 5–15)
APPEARANCE UR: CLEAR
AST SERPL W P-5'-P-CCNC: 19 U/L (ref 15–37)
ATRIAL RATE: 65 BPM
BARBITURATES UR QL SCN: NEGATIVE
BASOPHILS # BLD AUTO: 0 K/UL (ref 0–0.1)
BASOPHILS # BLD: 0 % (ref 0–1)
BENZODIAZ UR QL: NEGATIVE
BILIRUB SERPL-MCNC: 0.6 MG/DL (ref 0.2–1)
BILIRUB UR QL: NEGATIVE
BUN SERPL-MCNC: 18 MG/DL (ref 6–20)
BUN/CREAT SERPL: 17 (ref 12–20)
CALCIUM SERPL-MCNC: 9.3 MG/DL (ref 8.5–10.1)
CALCULATED P AXIS, ECG09: 71 DEGREES
CALCULATED R AXIS, ECG10: 36 DEGREES
CALCULATED T AXIS, ECG11: 68 DEGREES
CANNABINOIDS UR QL SCN: NEGATIVE
CHLORIDE SERPL-SCNC: 103 MMOL/L (ref 97–108)
CK SERPL-CCNC: 124 U/L (ref 39–308)
CO2 SERPL-SCNC: 26 MMOL/L (ref 21–32)
COCAINE UR QL SCN: NEGATIVE
COLOR UR: ABNORMAL
CREAT SERPL-MCNC: 1.05 MG/DL (ref 0.7–1.3)
DIAGNOSIS, 93000: NORMAL
DRUG SCRN COMMENT,DRGCM: NORMAL
EOSINOPHIL # BLD: 0.1 K/UL (ref 0–0.4)
EOSINOPHIL NFR BLD: 1 % (ref 0–7)
ERYTHROCYTE [DISTWIDTH] IN BLOOD BY AUTOMATED COUNT: 13.5 % (ref 11.5–14.5)
ETHANOL SERPL-MCNC: <10 MG/DL
GLOBULIN SER CALC-MCNC: 4.2 G/DL (ref 2–4)
GLUCOSE BLD STRIP.AUTO-MCNC: 116 MG/DL (ref 65–100)
GLUCOSE SERPL-MCNC: 93 MG/DL (ref 65–100)
GLUCOSE UR STRIP.AUTO-MCNC: NEGATIVE MG/DL
HCT VFR BLD AUTO: 46.1 % (ref 36.6–50.3)
HGB BLD-MCNC: 16 G/DL (ref 12.1–17)
HGB UR QL STRIP: NEGATIVE
INR BLD: 1 (ref 0.9–1.2)
INR PPP: 1 (ref 0.9–1.1)
KETONES UR QL STRIP.AUTO: 40 MG/DL
LEUKOCYTE ESTERASE UR QL STRIP.AUTO: NEGATIVE
LYMPHOCYTES # BLD AUTO: 19 % (ref 12–49)
LYMPHOCYTES # BLD: 1.9 K/UL (ref 0.8–3.5)
MCH RBC QN AUTO: 29.3 PG (ref 26–34)
MCHC RBC AUTO-ENTMCNC: 34.7 G/DL (ref 30–36.5)
MCV RBC AUTO: 84.3 FL (ref 80–99)
METHADONE UR QL: NEGATIVE
MONOCYTES # BLD: 0.4 K/UL (ref 0–1)
MONOCYTES NFR BLD AUTO: 4 % (ref 5–13)
NEUTS SEG # BLD: 7.5 K/UL (ref 1.8–8)
NEUTS SEG NFR BLD AUTO: 76 % (ref 32–75)
NITRITE UR QL STRIP.AUTO: NEGATIVE
OPIATES UR QL: NEGATIVE
P-R INTERVAL, ECG05: 202 MS
PCP UR QL: NEGATIVE
PH UR STRIP: 8 [PH] (ref 5–8)
PLATELET # BLD AUTO: 165 K/UL (ref 150–400)
POTASSIUM SERPL-SCNC: 4 MMOL/L (ref 3.5–5.1)
PROT SERPL-MCNC: 8 G/DL (ref 6.4–8.2)
PROT UR STRIP-MCNC: NEGATIVE MG/DL
PROTHROMBIN TIME: 10.5 SEC (ref 9–11.1)
Q-T INTERVAL, ECG07: 454 MS
QRS DURATION, ECG06: 96 MS
QTC CALCULATION (BEZET), ECG08: 472 MS
RBC # BLD AUTO: 5.47 M/UL (ref 4.1–5.7)
SERVICE CMNT-IMP: ABNORMAL
SODIUM SERPL-SCNC: 136 MMOL/L (ref 136–145)
SP GR UR REFRACTOMETRY: 1.02 (ref 1–1.03)
TROPONIN I BLD-MCNC: <0.04 NG/ML (ref 0–0.08)
TROPONIN I SERPL-MCNC: 0.05 NG/ML
TROPONIN I SERPL-MCNC: 0.05 NG/ML
UROBILINOGEN UR QL STRIP.AUTO: 0.2 EU/DL (ref 0.2–1)
VENTRICULAR RATE, ECG03: 65 BPM
WBC # BLD AUTO: 9.9 K/UL (ref 4.1–11.1)

## 2017-08-01 PROCEDURE — 96374 THER/PROPH/DIAG INJ IV PUSH: CPT

## 2017-08-01 PROCEDURE — 80307 DRUG TEST PRSMV CHEM ANLYZR: CPT | Performed by: EMERGENCY MEDICINE

## 2017-08-01 PROCEDURE — 36415 COLL VENOUS BLD VENIPUNCTURE: CPT | Performed by: EMERGENCY MEDICINE

## 2017-08-01 PROCEDURE — 70553 MRI BRAIN STEM W/O & W/DYE: CPT

## 2017-08-01 PROCEDURE — 93005 ELECTROCARDIOGRAM TRACING: CPT

## 2017-08-01 PROCEDURE — 85025 COMPLETE CBC W/AUTO DIFF WBC: CPT | Performed by: EMERGENCY MEDICINE

## 2017-08-01 PROCEDURE — 99218 HC RM OBSERVATION: CPT

## 2017-08-01 PROCEDURE — 74011250636 HC RX REV CODE- 250/636: Performed by: INTERNAL MEDICINE

## 2017-08-01 PROCEDURE — 74011250637 HC RX REV CODE- 250/637: Performed by: EMERGENCY MEDICINE

## 2017-08-01 PROCEDURE — 96361 HYDRATE IV INFUSION ADD-ON: CPT

## 2017-08-01 PROCEDURE — 85610 PROTHROMBIN TIME: CPT | Performed by: EMERGENCY MEDICINE

## 2017-08-01 PROCEDURE — 74011000250 HC RX REV CODE- 250: Performed by: EMERGENCY MEDICINE

## 2017-08-01 PROCEDURE — 65660000000 HC RM CCU STEPDOWN

## 2017-08-01 PROCEDURE — 74011250636 HC RX REV CODE- 250/636: Performed by: EMERGENCY MEDICINE

## 2017-08-01 PROCEDURE — 84484 ASSAY OF TROPONIN QUANT: CPT | Performed by: EMERGENCY MEDICINE

## 2017-08-01 PROCEDURE — 81003 URINALYSIS AUTO W/O SCOPE: CPT | Performed by: EMERGENCY MEDICINE

## 2017-08-01 PROCEDURE — 99285 EMERGENCY DEPT VISIT HI MDM: CPT

## 2017-08-01 PROCEDURE — 80053 COMPREHEN METABOLIC PANEL: CPT | Performed by: EMERGENCY MEDICINE

## 2017-08-01 PROCEDURE — 82962 GLUCOSE BLOOD TEST: CPT

## 2017-08-01 PROCEDURE — 82550 ASSAY OF CK (CPK): CPT | Performed by: EMERGENCY MEDICINE

## 2017-08-01 PROCEDURE — 96375 TX/PRO/DX INJ NEW DRUG ADDON: CPT

## 2017-08-01 PROCEDURE — A9576 INJ PROHANCE MULTIPACK: HCPCS | Performed by: EMERGENCY MEDICINE

## 2017-08-01 PROCEDURE — 96376 TX/PRO/DX INJ SAME DRUG ADON: CPT

## 2017-08-01 PROCEDURE — 70450 CT HEAD/BRAIN W/O DYE: CPT

## 2017-08-01 PROCEDURE — 96372 THER/PROPH/DIAG INJ SC/IM: CPT

## 2017-08-01 PROCEDURE — 74011250637 HC RX REV CODE- 250/637: Performed by: INTERNAL MEDICINE

## 2017-08-01 PROCEDURE — 85610 PROTHROMBIN TIME: CPT

## 2017-08-01 RX ORDER — ASPIRIN 325 MG
325 TABLET ORAL ONCE
Status: DISPENSED | OUTPATIENT
Start: 2017-08-01 | End: 2017-08-01

## 2017-08-01 RX ORDER — GUAIFENESIN 100 MG/5ML
81 LIQUID (ML) ORAL DAILY
Status: DISCONTINUED | OUTPATIENT
Start: 2017-08-02 | End: 2017-08-02 | Stop reason: HOSPADM

## 2017-08-01 RX ORDER — HYDROMORPHONE HYDROCHLORIDE 1 MG/ML
0.2 INJECTION, SOLUTION INTRAMUSCULAR; INTRAVENOUS; SUBCUTANEOUS
Status: COMPLETED | OUTPATIENT
Start: 2017-08-01 | End: 2017-08-01

## 2017-08-01 RX ORDER — ENOXAPARIN SODIUM 100 MG/ML
40 INJECTION SUBCUTANEOUS EVERY 24 HOURS
Status: DISCONTINUED | OUTPATIENT
Start: 2017-08-01 | End: 2017-08-02 | Stop reason: HOSPADM

## 2017-08-01 RX ORDER — MECLIZINE HCL 12.5 MG 12.5 MG/1
25 TABLET ORAL
Status: COMPLETED | OUTPATIENT
Start: 2017-08-01 | End: 2017-08-01

## 2017-08-01 RX ORDER — ACETAMINOPHEN 325 MG/1
650 TABLET ORAL
Status: DISCONTINUED | OUTPATIENT
Start: 2017-08-01 | End: 2017-08-02 | Stop reason: HOSPADM

## 2017-08-01 RX ORDER — MORPHINE SULFATE 4 MG/ML
2 INJECTION, SOLUTION INTRAMUSCULAR; INTRAVENOUS
Status: DISCONTINUED | OUTPATIENT
Start: 2017-08-01 | End: 2017-08-02 | Stop reason: HOSPADM

## 2017-08-01 RX ORDER — MORPHINE SULFATE 4 MG/ML
4 INJECTION, SOLUTION INTRAMUSCULAR; INTRAVENOUS
Status: COMPLETED | OUTPATIENT
Start: 2017-08-01 | End: 2017-08-01

## 2017-08-01 RX ORDER — ONDANSETRON 2 MG/ML
4 INJECTION INTRAMUSCULAR; INTRAVENOUS
Status: DISCONTINUED | OUTPATIENT
Start: 2017-08-01 | End: 2017-08-02 | Stop reason: HOSPADM

## 2017-08-01 RX ORDER — ACETAMINOPHEN 500 MG
1000 TABLET ORAL ONCE
Status: COMPLETED | OUTPATIENT
Start: 2017-08-01 | End: 2017-08-01

## 2017-08-01 RX ORDER — SODIUM CHLORIDE 0.9 % (FLUSH) 0.9 %
5-10 SYRINGE (ML) INJECTION AS NEEDED
Status: DISCONTINUED | OUTPATIENT
Start: 2017-08-01 | End: 2017-08-02 | Stop reason: HOSPADM

## 2017-08-01 RX ORDER — NALOXONE HYDROCHLORIDE 0.4 MG/ML
0.4 INJECTION, SOLUTION INTRAMUSCULAR; INTRAVENOUS; SUBCUTANEOUS AS NEEDED
Status: DISCONTINUED | OUTPATIENT
Start: 2017-08-01 | End: 2017-08-02 | Stop reason: HOSPADM

## 2017-08-01 RX ORDER — ONDANSETRON 2 MG/ML
4 INJECTION INTRAMUSCULAR; INTRAVENOUS
Status: COMPLETED | OUTPATIENT
Start: 2017-08-01 | End: 2017-08-01

## 2017-08-01 RX ORDER — SODIUM CHLORIDE 0.9 % (FLUSH) 0.9 %
5-10 SYRINGE (ML) INJECTION EVERY 8 HOURS
Status: DISCONTINUED | OUTPATIENT
Start: 2017-08-01 | End: 2017-08-02 | Stop reason: HOSPADM

## 2017-08-01 RX ADMIN — ACETAMINOPHEN 650 MG: 325 TABLET ORAL at 21:02

## 2017-08-01 RX ADMIN — SODIUM CHLORIDE 1000 ML: 900 INJECTION, SOLUTION INTRAVENOUS at 13:34

## 2017-08-01 RX ADMIN — MORPHINE SULFATE 4 MG: 4 INJECTION, SOLUTION INTRAMUSCULAR; INTRAVENOUS at 15:55

## 2017-08-01 RX ADMIN — SODIUM CHLORIDE 10 MG: 9 INJECTION INTRAMUSCULAR; INTRAVENOUS; SUBCUTANEOUS at 15:55

## 2017-08-01 RX ADMIN — GADOTERIDOL 17 ML: 279.3 INJECTION, SOLUTION INTRAVENOUS at 19:09

## 2017-08-01 RX ADMIN — ENOXAPARIN SODIUM 40 MG: 40 INJECTION SUBCUTANEOUS at 21:04

## 2017-08-01 RX ADMIN — HYDROMORPHONE HYDROCHLORIDE 0.2 MG: 1 INJECTION, SOLUTION INTRAMUSCULAR; INTRAVENOUS; SUBCUTANEOUS at 13:34

## 2017-08-01 RX ADMIN — ONDANSETRON 4 MG: 2 INJECTION INTRAMUSCULAR; INTRAVENOUS at 11:53

## 2017-08-01 RX ADMIN — Medication 10 ML: at 21:04

## 2017-08-01 RX ADMIN — ACETAMINOPHEN 1000 MG: 500 TABLET ORAL at 12:12

## 2017-08-01 RX ADMIN — ONDANSETRON 4 MG: 2 INJECTION INTRAMUSCULAR; INTRAVENOUS at 13:34

## 2017-08-01 RX ADMIN — MECLIZINE 25 MG: 12.5 TABLET ORAL at 11:59

## 2017-08-01 NOTE — IP AVS SNAPSHOT
[address] [facility] 
[phone] Patient: Chris Hackett MRN: [id number] INM:3/18/[birth year]60 Current Discharge Medication List  
  
START taking these medications Dose & Instructions Dispensing Information Comments Morning Noon Evening Bedtime  
 meclizine 25 mg tablet Commonly known as:  ANTIVERT Your last dose was: Your next dose is:    
   
   
 Dose:  25 mg Take 1 Tab by mouth three (3) times daily as needed. For dizziness Quantity:  30 Tab Refills:  0  
     
   
   
   
  
 melatonin Tab tablet Your last dose was: Your next dose is:    
   
   
 Dose:  5 mg Take 1 Tab by mouth nightly. Quantity:  30 Tab Refills:  0  
     
   
   
   
  
 OTHER(NON-FORMULARY) Your last dose was: Your next dose is:    
   
   
 Vestibular Physical Therapy Please Eval and Treat  Dx: Vertigo Quantity:  1 Each Refills:  0 CONTINUE these medications which have NOT CHANGED Dose & Instructions Dispensing Information Comments Morning Noon Evening Bedtime ADVIL 200 mg tablet Generic drug:  ibuprofen Your last dose was: Your next dose is:    
   
   
 Dose:  200 mg Take 200 mg by mouth every six (6) hours as needed. 400mg 1-2 times a day PRN Refills:  0  
     
   
   
   
  
 aspirin 81 mg chewable tablet Your last dose was: Your next dose is:    
   
   
 Dose:  81 mg Take 1 Tab by mouth daily for 360 days. Quantity:  30 Tab Refills:  11 FISH OIL 1,000 mg Cap Generic drug:  omega-3 fatty acids-vitamin e Your last dose was: Your next dose is:    
   
   
 Dose:  1 Cap Take 1 Cap by mouth daily. Refills:  0  
     
   
   
   
  
 glucosamine-chondroitin 500-400 mg Cap Commonly known as:  20 [facility] Your last dose was: Your next dose is: Dose:  2 Cap Take 2 Caps by mouth daily. Refills:  0 PROBIOTIC 4X 10-15 mg Tbec Generic drug:  B.infantis-B.ani-B.long-B.bifi Your last dose was: Your next dose is:    
   
   
 Dose:  1 Tab Take 1 Tab by mouth daily. Refills:  0  
     
   
   
   
  
 TESTIM 50 mg/5 gram (1 %) gel Generic drug:  testosterone Your last dose was: Your next dose is:    
   
   
 Dose:  5 g  
5 g by TransDERmal route daily. Refills:  0 STOP taking these medications BELSOMRA 10 mg tablet Generic drug:  suvorexant Where to Get Your Medications Information on where to get these meds will be given to you by the nurse or doctor. ! Ask your nurse or doctor about these medications  
  meclizine 25 mg tablet  
 melatonin Tab tablet OTHER(NON-FORMULARY)

## 2017-08-01 NOTE — ED NOTES
Returned from CT scan to room 35 with monitor RN and paramedic. Pt confused not correctly following command in ct SACN, RECENT HX OF TAKING NORTRIPTYLINE ON 26TH OF July from ENT for vertigo. BS fro ems 95. Wife states he called her this am from his work, wanted t be picked up as had headache, nausea and vertigo.

## 2017-08-01 NOTE — ED NOTES
Urine sample was sent prior to MD ordering additional urine testing. Lab staff states that they are not able to add UDS to originial sample due to sample has already been used/diluted. Notified Dr Gera Mitchell.

## 2017-08-01 NOTE — IP AVS SNAPSHOT
[address] [facility] 
[phone] Patient: Quincy Patel MRN: [id number] LIZBET:[birth year] You are allergic to the following No active allergies Recent Documentation Height Weight BMI Smoking Status 1.88 m 93.7 kg 26.53 kg/m2 Former Smoker Unresulted Labs Order Current Status SAMPLE TO BLOOD BANK In process Emergency Contacts Name Discharge Info Relation Home Work Mobile [phone] CAREGIVER [3] Spouse [3] [phone] About your hospitalization You were admitted on:  August 1, 2017 You last received care in the:  [facility] CARDIOPULMONARY CARE You were discharged on:  August 2, 2017 Unit phone number:  [phone] Why you were hospitalized Your primary diagnosis was:  Not on File Your diagnoses also included:  S/P Avr, Ascending Aortic Aneurysm (Hcc), Ecg Abnormality, Altered Mental State Providers Seen During Your Hospitalizations Provider Role Specialty Primary office phone Romelia Rogers MD Attending Provider Emergency Medicine [phone] Roslyn Bansal MD Attending Provider Emergency Medicine [phone] Gin Trevino MD Attending Provider Internal Medicine [phone] Your Primary Care Physician (PCP) Primary Care Physician Office Phone Office Fax Ferdinand Correiaale [phone] Follow-up Information Follow up With Details Comments Contact Info Cachorro Robert MD   [address] [facility] [address] [facility] [address] 
[phone] Patrizia Cerda MD  Call and schadule for 30 days event monitor. Also make an appointment to see Dr Amanda Field after a months once that has been removed to discussed the results [address] [facility] 
[phone] Your Appointments  Tuesday August 15, 2017  8:00 AM EDT  
 ECHO CARDIOGRAMS 2D with [address] [facility] Chelsie Ortiz  
[phone] Tuesday August 15, 2017  9:00 AM EDT  
6 MONTH with MD [facility] Chelsie Ortiz  
[phone] Current Discharge Medication List  
  
START taking these medications Dose & Instructions Dispensing Information Comments Morning Noon Evening Bedtime  
 meclizine 25 mg tablet Commonly known as:  ANTIVERT Your last dose was: Your next dose is:    
   
   
 Dose:  25 mg Take 1 Tab by mouth three (3) times daily as needed. For dizziness Quantity:  30 Tab Refills:  0  
     
   
   
   
  
 melatonin Tab tablet Your last dose was: Your next dose is:    
   
   
 Dose:  5 mg Take 1 Tab by mouth nightly. Quantity:  30 Tab Refills:  0  
     
   
   
   
  
 OTHER(NON-FORMULARY) Your last dose was: Your next dose is:    
   
   
 Vestibular Physical Therapy Please Eval and Treat  Dx: Vertigo Quantity:  1 Each Refills:  0 CONTINUE these medications which have NOT CHANGED Dose & Instructions Dispensing Information Comments Morning Noon Evening Bedtime ADVIL 200 mg tablet Generic drug:  ibuprofen Your last dose was: Your next dose is:    
   
   
 Dose:  200 mg Take 200 mg by mouth every six (6) hours as needed. 400mg 1-2 times a day PRN Refills:  0  
     
   
   
   
  
 aspirin 81 mg chewable tablet Your last dose was: Your next dose is:    
   
   
 Dose:  81 mg Take 1 Tab by mouth daily for 360 days. Quantity:  30 Tab Refills:  11 FISH OIL 1,000 mg Cap Generic drug:  omega-3 fatty acids-vitamin e Your last dose was: Your next dose is: Dose:  1 Cap Take 1 Cap by mouth daily. Refills:  0  
     
   
   
   
  
 glucosamine-chondroitin 500-400 mg Cap Commonly known as:  20 [facility] [place] Your last dose was: Your next dose is:    
   
   
 Dose:  2 Cap Take 2 Caps by mouth daily. Refills:  0 PROBIOTIC 4X 10-15 mg Tbec Generic drug:  B.infantis-B.ani-B.long-B.bifi Your last dose was: Your next dose is:    
   
   
 Dose:  1 Tab Take 1 Tab by mouth daily. Refills:  0  
     
   
   
   
  
 TESTIM 50 mg/5 gram (1 %) gel Generic drug:  testosterone Your last dose was: Your next dose is:    
   
   
 Dose:  5 g  
5 g by TransDERmal route daily. Refills:  0 STOP taking these medications BELSOMRA 10 mg tablet Generic drug:  suvorexant Where to Get Your Medications Information on where to get these meds will be given to you by the nurse or doctor. ! Ask your nurse or doctor about these medications  
  meclizine 25 mg tablet  
 melatonin Tab tablet OTHER(NON-FORMULARY) Discharge Instructions [employer] Activation Thank you for requesting access to [employer]. Please follow the instructions below to securely access and download your online medical record. [employer] allows you to send messages to your doctor, view your test results, renew your prescriptions, schedule appointments, and more. How Do I Sign Up? 1. In your internet browser, go to [email or web address] 
2. Click on the First Time User? Click Here link in the Sign In box. You will be redirect to the New Member Sign Up page. 3. Enter your [employer] Access Code exactly as it appears below. You will not need to use this code after youve completed the sign-up process. If you do not sign up before the expiration date, you must request a new code. [employer] Access Code: [email or web address] Expires: 10/31/2017  5:11 PM (This is the date your [employer] access code will ) 4. Enter the last four digits of your Social Security Number (xxxx) and Date of Birth (mm/dd/yyyy) as indicated and click Submit. You will be taken to the next sign-up page. 5. Create a [employer] ID. This will be your [employer] login ID and cannot be changed, so think of one that is secure and easy to remember. 6. Create a [employer] password. You can change your password at any time. 7. Enter your Password Reset Question and Answer. This can be used at a later time if you forget your password. 8. Enter your e-mail address. You will receive e-mail notification when new information is available in [address] 9. Click Sign Up. You can now view and download portions of your medical record. 10. Click the Download Summary menu link to download a portable copy of your medical information. Additional Information If you have questions, please visit the Frequently Asked Questions section of the [employer] website at [email or web address] Remember, [employer] is NOT to be used for urgent needs. For medical emergencies, dial 911. HOSPITALIST DISCHARGE INSTRUCTIONS 
 
NAME: Johanna Pritchett :  1955 MRN:  [id number] Date/Time:  2017 4:52 PM 
 
ADMIT DATE: 2017 DISCHARGE DATE: 2017 DISCHARGE DIAGNOSIS: 
Dizziness/Vertigo MEDICATIONS: 
· It is important that you take the medication exactly as they are prescribed. · Keep your medication in the bottles provided by the pharmacist and keep a list of the medication names, dosages, and times to be taken in your wallet. · Do not take other medications without consulting your doctor. Pain Management: per above medications What to do at [facility] Recommended diet:  Resume previous diet Recommended activity: Activity as tolerated If you have questions regarding the hospital related prescriptions or hospital related issues please call Vazquez Gibson at . If you experience any of the following symptoms then please call your primary care physician or return to the emergency room if you cannot get hold of your doctor: 
Fever, chills, nausea, vomiting, diarrhea, change in mentation, falling, bleeding, shortness of breath Follow Up: 
Dr. Giorgi Callaway MD  you are to call and set up an appointment to see them in 7-10 days. Information obtained by : 
I understand that if any problems occur once I am at home I am to contact my physician. I understand and acknowledge receipt of the instructions indicated above. Physician's or R.N.'s Signature                                                                  Date/Time Patient or Representative Signature                                                          Date/Time Discharge Orders None Help Scout Announcement We are excited to announce that we are making your provider's discharge notes available to you in Help Scout. You will see these notes when they are completed and signed by the physician that discharged you from your recent hospital stay. If you have any questions or concerns about any information you see in Help [employer], please call the Health Information Department where you were seen or reach out to your Primary Care Provider for more information about your plan of care. Introducing [facility]! Cassie Caraballo introduces Help [employer] patient portal. Now you can access parts of your medical record, email your doctor's office, and request medication refills online.    
 
1. In your internet browser, go to [email or web address] 2. Click on the First Time User? Click Here link in the Sign In box. You will see the New Member Sign Up page. 3. Enter your [employer] Access Code exactly as it appears below. You will not need to use this code after youve completed the sign-up process. If you do not sign up before the expiration date, you must request a new code. · [employer] Access Code: [email or web address] Expires: 10/31/2017  5:11 PM 
 
4. Enter the last four digits of your Social Security Number (xxxx) and Date of Birth (mm/dd/yyyy) as indicated and click Submit. You will be taken to the next sign-up page. 5. Create a [employer] ID. This will be your [employer] login ID and cannot be changed, so think of one that is secure and easy to remember. 6. Create a [employer] password. You can change your password at any time. 7. Enter your Password Reset Question and Answer. This can be used at a later time if you forget your password. 8. Enter your e-mail address. You will receive e-mail notification when new information is available in [address] 9. Click Sign Up. You can now view and download portions of your medical record. 10. Click the Download Summary menu link to download a portable copy of your medical information. If you have questions, please visit the Frequently Asked Questions section of the [employer] website. Remember, [employer] is NOT to be used for urgent needs. For medical emergencies, dial 911. Now available from your iPhone and Android! General Information Please provide this summary of care documentation to your next provider. Patient Signature:  ____________________________________________________________ Date:  ____________________________________________________________  
  
Peter Landsman Provider Signature:  ____________________________________________________________ Date:  ____________________________________________________________

## 2017-08-01 NOTE — CONSULTS
[address] [facility] [address] [facility]     Date of  Admission: 8/1/2017 10:47 AM     Admission type:Emergency    Consult for:  ECG changes  Consult by: ED MD      Subjective:     Miladis Herrera is a 60 y.o. male admitted for c/o dizziness, confusion, nausea, AMS. Initially called STEMI alert with slight ECG changes. Dr. Danelle Burton of [facility] responded, reviewed ECGs, deemed not a STEMI, then contacted [facility]. ECG with slight ST changes inferiorly, not STEMI. Troponin 0.05 x 2, flat, insignificant. Patient denies CP, SOB, palpitations. He's c/o dizziness/vertigo, confusion. Patient is s/p AVR repair and ascending aneurysm 12/2016. Since his surgery has had this dizziness/vertigo, nausea. Is seen by ENT/Balance MD.  Started on Nortriptyline last week, since the symptoms have become worse. CT of head negative for acute embolic or hemorrhagic CVA.         Patient Active Problem List    Diagnosis Date Noted    ECG abnormality 08/01/2017    S/P AVR 12/12/2016    Ascending aortic aneurysm (HCC) 12/05/2016    Nonrheumatic aortic valve stenosis 11/02/2016      Pascual Bonner MD  Past Medical History:   Diagnosis Date    ECG abnormality 8/1/2017    Insomnia     Murmur     Valvular heart disease       Social History     Social History    Marital status:      Spouse name: N/A    Number of children: N/A    Years of education: N/A     Social History Main Topics    Smoking status: Former Smoker     Packs/day: 1.00     Years: 10.00     Quit date: 1980    Smokeless tobacco: Never Used    Alcohol use No      Comment: special occasions    Drug use: No    Sexual activity: Not on file     Other Topics Concern    Not on file     Social History Narrative     No Known Allergies   Family History   Problem Relation Age of Onset    Stroke Mother     Heart Failure Mother     Cancer Sister      Non-Hodgkins     Heart Disease Maternal Grandfather     Other Father      legioneres disease    Lung Disease Father      Annetta Carmichael Other Brother      aortic valve     Cancer Sister       Current Facility-Administered Medications   Medication Dose Route Frequency    aspirin (ASPIRIN) tablet 325 mg  325 mg Oral ONCE    morphine injection 4 mg  4 mg IntraVENous NOW    prochlorperazine (COMPAZINE) with saline injection 10 mg  10 mg IntraVENous NOW     Current Outpatient Prescriptions   Medication Sig    ibuprofen (ADVIL) 200 mg tablet Take 200 mg by mouth every six (6) hours as needed. 400mg 1-2 times a day PRN    traMADol (ULTRAM) 50 mg tablet Take 1-2 Tabs by mouth every six (6) hours as needed for Pain. Max Daily Amount: 400 mg. Indications: PAIN    aspirin 81 mg chewable tablet Take 1 Tab by mouth daily for 360 days.  senna-docusate (PERICOLACE) 8.6-50 mg per tablet Take 1 Tab by mouth two (2) times a day.  suvorexant (BELSOMRA) 10 mg tablet Take  by mouth nightly as needed for Insomnia.  omega-3 fatty acids-vitamin e (FISH OIL) 1,000 mg cap Take 1 Cap by mouth daily.  GELATIN, BULK, by Does Not Apply route daily.  glucosamine-chondroitin (ARTHX) 500-400 mg cap Take 2 Caps by mouth daily.  B.infantis-B.ani-B.long-B.bifi (PROBIOTIC 4X) 10-15 mg TbEC Take 1 Tab by mouth daily.  ascorbic acid, vitamin C, (VITAMIN C) 500 mg tablet Take 500 mg by mouth every other day. [address] 045-20-00 mg-mg-million tab Take 1 Tab by mouth every other day.  UBIQUINONE PO Take 1 Tab by mouth daily.  testosterone (TESTIM) 50 mg/5 gram (1 %) gel 5 g by TransDERmal route daily.         Review of Symptoms:   Constitutional: negative  Eyes: negative   Ears, nose, mouth, throat, and face: negative  Respiratory: negative   Cardiovascular: negative   Gastrointestinal: nausea associated with dizziness  Genitourinary:negative   Musculoskeletal:negative   Neurological: ongoing dizziness, vertigo  Endocrine: negative Objective:      Visit Vitals    /78    Pulse 65    Temp 97.5 °F (36.4 °C)    Resp 16    Ht 6' 2\" (1.88 m)    Wt 86.2 kg (190 lb)    SpO2 97%    BMI 24.39 kg/m2       Physical:   General: WNWD  male in no acute distress  Heart: RRR, no m/S3/JVD, no carotid bruits   Lungs: clear   Abdomen: Soft, +BS, NTND   Extremities: LE curly +DP/PT, no edema   Neurologic: Grossly normal    Data Review:   Recent Labs      08/01/17   1117   WBC  9.9   HGB  16.0   HCT  46.1   PLT  165     Recent Labs      08/01/17   1117  08/01/17   1110   NA  136   --    K  4.0   --    CL  103   --    CO2  26   --    GLU  93   --    BUN  18   --    CREA  1.05   --    CA  9.3   --    ALB  3.8   --    TBILI  0.6   --    SGOT  19   --    ALT  35   --    INR  1.0  1.0       Recent Labs      08/01/17   1335  08/01/17   1117   TROIQ  0.05*  0.05*   CPK   --   124       No intake or output data in the 24 hours ending 08/01/17 1532     Cardiographics    Telemetry: SR  ECG: SR with minimal ST changes inferiorly  Echocardiogram: pending    L Cardiac catheterization 10/20/2016 at [facility]: no significant L main dz, mild proximal LAD dz, no significant circ/OM dz, mild proximal RCA dz -   non-obstructive CAD        Assessment:       Active Problems:    Ascending aortic aneurysm (HCC) (12/5/2016)      S/P AVR (12/12/2016)      Overview: 1. AORTIC VALVE REPLACEMENT USING A 25 MM [facility] BIOPROSTHESIS, ECC      2. REPAIR OF ASCENDING ANEURYSM USING A 24MM STRAIGHT HEMASHIELD GRAFT      ECG abnormality (8/1/2017)         Plan:     ECG abnormality:  Not a STEMI, insignificant changes   Cardiac cath in Oct 2016 pre-AVR without obstructive disease  Continue on ASA, and antilipid medications  No BB or other antiHTN medications as these were stopped in the spring due to the c/o dizziness and possible relationship to low BP    Concerns that with hx of AVR, could there be small emboli that could be causing these symptoms?     Echo ordered today.  MRI ordered by ED MD for further evaluation    Thank you for consulting [address] Dmitri, NP       [facility]    8/1/2017         Agree with note as outlined by  NP. I confirm findings in history and physical exam. No additional findings noted. Agree with plan as outlined above. EKG symptoms reviewed. ? Component  of bradycardia post AVR. Consider loop monitor if evaluation negative.     [address], MD

## 2017-08-01 NOTE — ED NOTES
Notified MRI staff that screening form has been completed, MRI staff gives ETA of \"after 6pm\". Updated pt on plan of care.

## 2017-08-01 NOTE — ED NOTES
Dr. Uma Ames at bedside, patient continues to ask staff to \"stop doing this\", wife at bedside asked Dr. Uma Ames to \"stop\" give us a minute\"

## 2017-08-01 NOTE — ED PROVIDER NOTES
HPI Comments: Jose Ayala is a 60 y.o. male, pmhx significant for valvular heart disease, vertigo, and murmur, who presents via EMS with family to the ED c/o altered mental status x this morning. Pt states that he was at work, sitting, when he felt the sudden onset dizziness, and right-sided temporal headache associated with nausea. Per wife, pt was c/o feeling near-syncope at onset of symptoms. Pt called wife, who took him to [facility], and were then transferred to [facility] ED for evaluation of possible STEMI. Pt specifically denies any chest pain, and only c/o the symptoms associated with his vertigo. Pt states that he recently started taking Nortriptyline x 4 days ago for his vertigo as prescribed by his ENT. Per family, pt is noted to be more confused, and pt states that he \"definitley\" has a memory problem right now. Pt states that he feels like he's \"drafting out,\" at the onset of \"spinning sensation. \" Pt denies neck pain, fever, chills, nausea, vomiting, abdominal pain, sweating, or sensations of spinning right now. PCP: Giorgi Callaway MD  ENT: Dr. Tex Hinds    PSHx: Significant for aortic valve repair, ascending aortic aneurysm repair, orthopaedic (right foot, several shoulder surgeries)  Social Hx: - tobacco (former), - EtOH (special occasions), - Illicit Drugs    There are no other complaints, changes, or physical findings at this time. The history is provided by the patient. No  was used.         Past Medical History:   Diagnosis Date    Insomnia     Murmur     Valvular heart disease        Past Surgical History:   Procedure Laterality Date    HX HEART VALVE SURGERY  12/16/2016    AVR    HX HEENT      jaw sugery to get teeth lined up    HX ORTHOPAEDIC      rt foot - several surgeries on rt toes    HX ORTHOPAEDIC      several shoulder surgeries    VASCULAR SURGERY PROCEDURE UNLIST  12/16/2016    ascending aortic anerysm - repaired with graft         Family History:   Problem Relation Age of Onset    Stroke Mother     Heart Failure Mother     Cancer Sister      Non-Hodgkins     Heart Disease Maternal Grandfather     Other Father      legioneres disease    Lung Disease Father      Debby Tesfaye Other Brother      aortic valve     Cancer Sister        Social History     Social History    Marital status:      Spouse name: N/A    Number of children: N/A    Years of education: N/A     Occupational History    Not on file. Social History Main Topics    Smoking status: Former Smoker     Packs/day: 1.00     Years: 10.00     Quit date: 1980    Smokeless tobacco: Never Used    Alcohol use No      Comment: special occasions    Drug use: No    Sexual activity: Not on file     Other Topics Concern    Not on file     Social History Narrative         ALLERGIES: Review of patient's allergies indicates no known allergies. Review of Systems   Constitutional: Negative. Negative for chills, diaphoresis and fever. Eyes: Negative. Respiratory: Negative for chest tightness. Cardiovascular: Negative for chest pain. Gastrointestinal: Positive for nausea. Negative for abdominal pain and vomiting. Endocrine: Negative for heat intolerance. Genitourinary: Negative for dysuria. Musculoskeletal: Negative for back pain. Skin: Negative for rash. Allergic/Immunologic: Negative for immunocompromised state. Neurological: Positive for dizziness and headaches. Hematological: Does not bruise/bleed easily. Psychiatric/Behavioral: Positive for confusion. All other systems reviewed and are negative.       Patient Vitals for the past 12 hrs:   Temp Pulse Resp BP SpO2   08/01/17 1430 - 65 16 124/78 97 %   08/01/17 1400 - 68 15 123/72 98 %   08/01/17 1330 - 67 16 (!) 134/93 98 %   08/01/17 1300 - 77 18 (!) 122/94 100 %   08/01/17 1230 - 65 17 129/79 100 %   08/01/17 1227 - 68 18 - 100 %   08/01/17 1200 - 72 12 121/73 99 %   08/01/17 1145 - 69 16 139/85 100 %   08/01/17 1109 97.5 °F (36.4 °C) 70 18 121/78 100 %     Physical Exam   Constitutional: He is oriented to person, place, and time. He appears well-developed and well-nourished. No distress. NAD   HENT:   Head: Normocephalic and atraumatic. Eyes: EOM are normal. Pupils are equal, round, and reactive to light. Mild horizontal nystagmus   Neck: Normal range of motion. Neck supple. Mild cervical tenderness   Cardiovascular: Normal rate, regular rhythm and normal heart sounds. Pulmonary/Chest: Effort normal and breath sounds normal. He has no wheezes. Abdominal: Soft. Bowel sounds are normal. There is no tenderness. Musculoskeletal: Normal range of motion. He exhibits no edema or tenderness. Neurological: He is alert and oriented to person, place, and time. No cranial nerve deficit. Sensory motor intact   Skin: Skin is warm and dry. Psychiatric: He has a normal mood and affect. Nursing note and vitals reviewed.        MDM  Number of Diagnoses or Management Options  Altered mental status, unspecified altered mental status type:   Dehydration:   ECG abnormality:   Episodic tension-type headache, not intractable:   Nausea and vomiting, intractability of vomiting not specified, unspecified vomiting type:   Vertigo:   Diagnosis management comments:   DDx: CAD, vertigo, CVA, TIA, dehydration, electrolyte abnormality, anxiety       Amount and/or Complexity of Data Reviewed  Clinical lab tests: reviewed and ordered  Tests in the radiology section of CPT®: ordered and reviewed  Tests in the medicine section of CPT®: ordered and reviewed  Obtain history from someone other than the patient: yes (Wife)  Review and summarize past medical records: yes  Discuss the patient with other providers: yes (Cardiology, neurology, hospitalist)  Independent visualization of images, tracings, or specimens: yes    Critical Care  Total time providing critical care: 30-74 minutes    Patient Progress  Patient progress: stable    ED Course       Procedures    Progress Note:  10:42 AM  Pt pre-alerted as a STEMI, cardiology paged (VCS)  Written by STEFANIA Browne, as dictated by Jerardo Mitchell MD.    Progress Note:  10:48 AM  Dr. Alfie Romano (cardiology) at bedside, requests STAT CT. CT ordered. Written by STEFANIA Browne, as dictated by Jerardo Mitchell MD.    EKG interpretation: (Preliminary) 10:48 AM  Rhythm: normal sinus rhythm; and regular . Rate (approx.): 65; Axis: normal; AZ interval: normal; QRS interval: normal ; ST/T wave: ST abnormality elevated in II and III  Written by STEFANIA Browne, as dictated by Jerardo Mitchell MD.     Progress Note:  10:49 AM  Per Dr. Alfie Romano, EKG is clear, no STEMI identified. Written by STEFANIA Browne, as dictated by Jerardo Mitchell MD.       CONSULT NOTE:   1:39 PM  Jerardo Mitchell MD spoke with Alan Archer MD,   Specialty: Tele-neurology  Discussed pt's hx, disposition, and available diagnostic and imaging results. Reviewed care plans. Consultant agrees with plans as outlined. Discussed transient global amnesia vs TIA. Consultant recommends admission for MRI and neurology consult. He said that it's unlikely TIA, but could be migraine mediated vertigo. Written by STEFANIA Browne, as dictated by Jerardo Mitchell MD.     HOSPITALIST CONSULT NOTE:   2:11 PM  Jerardo Mitchell MD spoke with Reyes Renteria MD,   Specialty: Hospitalist  Discussed pt's hx, disposition, and available diagnostic and imaging results. Reviewed care plans. Consultant will evaluate pt for admission.   Written by STEFANIA Browne, as dictated by Jerardo Mitchell MD.    CRITICAL CARE NOTE :    9:08 PM      IMPENDING DETERIORATION -Cardiovascular, CNS and Renal    ASSOCIATED RISK FACTORS - Dysrhythmia, Metabolic changes, Dehydration and CNS Decompensation    MANAGEMENT- Bedside Assessment and Supervision of Care    INTERPRETATION -  Xrays, CT Scan, ECG and Blood Pressure    INTERVENTIONS - hemodynamic mngmt, Neurologic interventions  and Metobolic interventions    CASE REVIEW - Hospitalist, Medical Sub-Specialist, Nursing and Family    TREATMENT RESPONSE -Stable    PERFORMED BY - Self        NOTES   :      I have spent 65 minutes of critical care time involved in lab review, consultations with specialist, family decision- making, bedside attention and documentation. During this entire length of time I was immediately available to the patient .     Kacey Nascimento MD                                                  LABORATORY TESTS:  Recent Results (from the past 12 hour(s))   EKG, 12 LEAD, INITIAL    Collection Time: 08/01/17 10:48 AM   Result Value Ref Range    Ventricular Rate 65 BPM    Atrial Rate 65 BPM    P-R Interval 202 ms    QRS Duration 96 ms    Q-T Interval 454 ms    QTC Calculation (Bezet) 472 ms    Calculated P Axis 71 degrees    Calculated R Axis 36 degrees    Calculated T Axis 68 degrees    Diagnosis       Normal sinus rhythm  Possible Left atrial enlargement  When compared with ECG of 13-DEC-2016 06:31,  ST elevation now present in Inferior leads  ST no longer elevated in Anterior leads  T wave inversion no longer evident in Inferior leads     GLUCOSE, POC    Collection Time: 08/01/17 11:08 AM   Result Value Ref Range    Glucose (POC) 116 (H) 65 - 100 mg/dL    Performed by Neyda Obrien    POC INR    Collection Time: 08/01/17 11:10 AM   Result Value Ref Range    INR (POC) 1.0 <1.2     POC TROPONIN-I    Collection Time: 08/01/17 11:14 AM   Result Value Ref Range    Troponin-I (POC) <0.04 0.00 - 0.08 ng/mL   CBC WITH AUTOMATED DIFF    Collection Time: 08/01/17 11:17 AM   Result Value Ref Range    WBC 9.9 4.1 - 11.1 K/uL    RBC 5.47 4.10 - 5.70 M/uL    HGB 16.0 12.1 - 17.0 g/dL    HCT 46.1 36.6 - 50.3 %    MCV 84.3 80.0 - 99.0 FL    MCH 29.3 26.0 - 34.0 PG    MCHC 34.7 30.0 - 36.5 g/dL    RDW 13.5 11.5 - 14.5 %    PLATELET 069 150 - 400 K/uL    NEUTROPHILS 76 (H) 32 - 75 %    LYMPHOCYTES 19 12 - 49 %    MONOCYTES 4 (L) 5 - 13 %    EOSINOPHILS 1 0 - 7 %    BASOPHILS 0 0 - 1 %    ABS. NEUTROPHILS 7.5 1.8 - 8.0 K/UL    ABS. LYMPHOCYTES 1.9 0.8 - 3.5 K/UL    ABS. MONOCYTES 0.4 0.0 - 1.0 K/UL    ABS. EOSINOPHILS 0.1 0.0 - 0.4 K/UL    ABS. BASOPHILS 0.0 0.0 - 0.1 K/UL   PROTHROMBIN TIME + INR    Collection Time: 08/01/17 11:17 AM   Result Value Ref Range    INR 1.0 0.9 - 1.1      Prothrombin time 10.5 9.0 - 05.8 sec   METABOLIC PANEL, COMPREHENSIVE    Collection Time: 08/01/17 11:17 AM   Result Value Ref Range    Sodium 136 136 - 145 mmol/L    Potassium 4.0 3.5 - 5.1 mmol/L    Chloride 103 97 - 108 mmol/L    CO2 26 21 - 32 mmol/L    Anion gap 7 5 - 15 mmol/L    Glucose 93 65 - 100 mg/dL    BUN 18 6 - 20 MG/DL    Creatinine 1.05 0.70 - 1.30 MG/DL    BUN/Creatinine ratio 17 12 - 20      GFR est AA >60 >60 ml/min/1.73m2    GFR est non-AA >60 >60 ml/min/1.73m2    Calcium 9.3 8.5 - 10.1 MG/DL    Bilirubin, total 0.6 0.2 - 1.0 MG/DL    ALT (SGPT) 35 12 - 78 U/L    AST (SGOT) 19 15 - 37 U/L    Alk.  phosphatase 104 45 - 117 U/L    Protein, total 8.0 6.4 - 8.2 g/dL    Albumin 3.8 3.5 - 5.0 g/dL    Globulin 4.2 (H) 2.0 - 4.0 g/dL    A-G Ratio 0.9 (L) 1.1 - 2.2     CK    Collection Time: 08/01/17 11:17 AM   Result Value Ref Range     39 - 308 U/L   TROPONIN I    Collection Time: 08/01/17 11:17 AM   Result Value Ref Range    Troponin-I, Qt. 0.05 (H) <0.05 ng/mL   ETHYL ALCOHOL    Collection Time: 08/01/17 11:17 AM   Result Value Ref Range    ALCOHOL(ETHYL),SERUM <10 <10 MG/DL   URINALYSIS W/ RFLX MICROSCOPIC    Collection Time: 08/01/17  1:04 PM   Result Value Ref Range    Color YELLOW/STRAW      Appearance CLEAR CLEAR      Specific gravity 1.019 1.003 - 1.030      pH (UA) 8.0 5.0 - 8.0      Protein NEGATIVE  NEG mg/dL    Glucose NEGATIVE  NEG mg/dL    Ketone 40 (A) NEG mg/dL    Bilirubin NEGATIVE  NEG      Blood NEGATIVE  NEG      Urobilinogen 0.2 0.2 - 1.0 EU/dL    Nitrites NEGATIVE  NEG      Leukocyte Esterase NEGATIVE  NEG     TROPONIN I    Collection Time: 08/01/17  1:35 PM   Result Value Ref Range    Troponin-I, Qt. 0.05 (H) <0.05 ng/mL       IMAGING RESULTS:  CT Results  (Last 48 hours)               08/01/17 1058  CT CODE NEURO HEAD WO CONTRAST Final result    Narrative:  EXAM:  CT CODE NEURO HEAD WO CONTRAST       INDICATION:   Acute altered mental status       COMPARISON: None. CONTRAST:  None. TECHNIQUE: Unenhanced CT of the head was performed using 5 mm images. Brain and   bone windows were generated. CT dose reduction was achieved through use of a   standardized protocol tailored for this examination and automatic exposure   control for dose modulation. FINDINGS:   The ventricles and sulci are normal in size, shape and configuration and   midline. There is no significant white matter disease. There is no intracranial   hemorrhage, extra-axial collection, mass, mass effect or midline shift. The   basilar cisterns are open. No acute infarct is identified. The bone windows   demonstrate no abnormalities. Retention cysts are seen in the maxillary sinuses   bilaterally. Mucoperiosteal thickening is noted in the ethmoid layer cells and   frontal sinuses bilaterally. IMPRESSION: No acute abnormality.                      MEDICATIONS GIVEN:  Medications   aspirin (ASPIRIN) tablet 325 mg (325 mg Oral Refused 8/1/17 1200)   morphine injection 4 mg (not administered)   prochlorperazine (COMPAZINE) with saline injection 10 mg (not administered)   ondansetron (ZOFRAN) injection 4 mg (4 mg IntraVENous Given 8/1/17 1153)   meclizine (ANTIVERT) tablet 25 mg (25 mg Oral Given 8/1/17 1159)   acetaminophen (TYLENOL) tablet 1,000 mg (1,000 mg Oral Given 8/1/17 1212)   HYDROmorphone (PF) (DILAUDID) injection 0.2 mg (0.2 mg IntraVENous Given 8/1/17 1334)   ondansetron (ZOFRAN) injection 4 mg (4 mg IntraVENous Given 8/1/17 1334)   sodium chloride 0.9 % bolus infusion 1,000 mL (1,000 mL IntraVENous New Bag 8/1/17 5906)       IMPRESSION:  1. Altered mental status, unspecified altered mental status type    2. ECG abnormality    3. Dehydration    4. Episodic tension-type headache, not intractable    5. Nausea and vomiting, intractability of vomiting not specified, unspecified vomiting type    6. Vertigo        PLAN: Admit to hospitalist  Admission Note:  2:20 PM  Patient is being admitted to the hospital by Dr. Júnior Weller. The results of their tests and reasons for their admission have been discussed with them and/or available family. They convey agreement and understanding for the need to be admitted and for their admission diagnosis. Written by Navid Drummond, ED Scribe, as dictated by Jerardo Mitchell MD.    Attestation: This note is prepared by Navid Drummond, acting as Scribe for Jerardo Mitchell MD.    Jerardo Mitchell MD: The scribe's documentation has been prepared under my direction and personally reviewed by me in its entirety. I confirm that the note above accurately reflects all work, treatment, procedures, and medical decision making performed by me.

## 2017-08-01 NOTE — H&P
Hospitalist Admission Note    NAME: Derotha Epley   :  1955   MRN:  [id number]     Date/Time:  2017 4:51 PM    Patient PCP: Ya Couch MD  ________________________________________________________________________    My assessment of this patient's clinical condition and my plan of care is as follows. Assessment / Plan:    Dizziness / Vertigo / Gait instability  Headaches  Encephalopathy  -CT head negative  -MRI brain pending  -Neurology consult  -check ESR  -PT OT eval and treat  -continue ASA. Check lipid profile    Mild troponin elevation  S/P Bioprosthetic AVR and repair of ascending aortic aneurysm   -telemetry bed  -Echocardiogram  -cardiology consult    Insomnia  -patient will supply his own suvorexant      Code Status:  Full  Surrogate Decision Maker:  wife    DVT Prophylaxis:   Lovenox  GI Prophylaxis: not indicated          Subjective:   CHIEF COMPLAINT:   Confusion     HISTORY OF PRESENT ILLNESS:     Facundo Bell is a 60 y.o.  male with a history of valvular heart disease, s/p bovine bioprosthetic AVR and ascending aortic aneurysm repair last 2016 who presents with symptoms of HA, dizziness and confusion. Patient has had intermittent headaches in the past associated with vision changes (seeing lights) but since his heart surgery, he has experienced intermittent dizziness, vertigo, gait imbalance and nausea / vomiting. He has seen Dr Mounika Roe (ENT) and was started on Nortriptyline 4 days ago. Since starting this new med, he felt that his symptoms have gotten worse. He was at work today when he suddenly experienced severe vertigo, headaches and nausea. He broke out in a cold sweat and felt like he could not even safely stand up so he called his wife to take him home.   En route home, the patient started acting confused and reported that he feels like passing out so his wife drive him to the nearest [facility].   He eventually was referred to this ER via EMS. Initial presentation was for possible STEMI but cardiology later cleared. CT head negative. Tele neurology recommended MRI and admission for AMS and work up for dizziness. We were asked to admit for work up and evaluation of the above problems. Past Medical History:   Diagnosis Date    ECG abnormality 8/1/2017    Insomnia     Murmur     Valvular heart disease         Past Surgical History:   Procedure Laterality Date    HX HEART VALVE SURGERY  12/16/2016    AVR    HX HEENT      jaw sugery to get teeth lined up    HX ORTHOPAEDIC      rt foot - several surgeries on rt toes    HX ORTHOPAEDIC      several shoulder surgeries    VASCULAR SURGERY PROCEDURE UNLIST  12/16/2016    ascending aortic anerysm - repaired with graft       Social History   Substance Use Topics    Smoking status: Former Smoker     Packs/day: 1.00     Years: 10.00     Quit date: 1980    Smokeless tobacco: Never Used    Alcohol use No      Comment: special occasions        Family History   Problem Relation Age of Onset    Stroke Mother     Heart Failure Mother     Cancer Sister      Non-Hodgkins     Heart Disease Maternal Grandfather     Other Father      legioneres disease    Lung Disease Father      Heber Casey Other Brother      aortic valve     Cancer Sister      No Known Allergies     Prior to Admission medications    Medication Sig Start Date End Date Taking? Authorizing Provider   ibuprofen (ADVIL) 200 mg tablet Take 200 mg by mouth every six (6) hours as needed. 400mg 1-2 times a day PRN 12/20/16   Historical Provider   traMADol (ULTRAM) 50 mg tablet Take 1-2 Tabs by mouth every six (6) hours as needed for Pain. Max Daily Amount: 400 mg. Indications: PAIN 12/20/16   Adelaide Maldonado PA-C   aspirin 81 mg chewable tablet Take 1 Tab by mouth daily for 360 days. 12/15/16 12/10/17  Adelaide Maldonado PA-C   senna-docusate (PERICOLACE) 8.6-50 mg per tablet Take 1 Tab by mouth two (2) times a day. 12/15/16   Jovanny Lees PA-C   suvorexant (BELSOMRA) 10 mg tablet Take  by mouth nightly as needed for Insomnia. Historical Provider   omega-3 fatty acids-vitamin e (FISH OIL) 1,000 mg cap Take 1 Cap by mouth daily. Historical Provider   GELATIN, BULK, by Does Not Apply route daily. Historical Provider   glucosamine-chondroitin (ARTHX) 500-400 mg cap Take 2 Caps by mouth daily. Historical Provider   B.infantis-B.ani-B.long-B.bifi (PROBIOTIC 4X) 10-15 mg TbEC Take 1 Tab by mouth daily. Historical Provider   ascorbic acid, vitamin C, (VITAMIN C) 500 mg tablet Take 500 mg by mouth every other day. Historical Provider   [address] [place] [address] 96-04-25 mg-mg-million tab Take 1 Tab by mouth every other day. Historical Provider   UBIQUINONE PO Take 1 Tab by mouth daily. Historical Provider   testosterone (TESTIM) 50 mg/5 gram (1 %) gel 5 g by TransDERmal route daily.     Historical Provider       REVIEW OF SYSTEMS:       Total of 12 systems reviewed as follows:       POSITIVE= underlined text  Negative = text not underlined  General:  fever, chills, sweats, generalized weakness, weight loss/gain,      loss of appetite   Eyes:    blurred vision, eye pain, loss of vision, double vision  ENT:    rhinorrhea, pharyngitis   Respiratory:   cough, sputum production, SOB, BRICE, wheezing, pleuritic pain   Cardiology:   chest pain, palpitations, orthopnea, PND, edema, syncope   Gastrointestinal:  abdominal pain , N/V, diarrhea, dysphagia, constipation, bleeding   Genitourinary:  frequency, urgency, dysuria, hematuria, incontinence   Muskuloskeletal :  arthralgia, myalgia, back pain  Hematology:  easy bruising, nose or gum bleeding, lymphadenopathy   Dermatological: rash, ulceration, pruritis, color change / jaundice  Endocrine:   hot flashes or polydipsia   Neurological:  headache, dizziness, confusion, focal weakness, paresthesia,     Speech difficulties, memory loss, gait difficulty  Psychological: Feelings of anxiety, depression, agitation    Objective:   VITALS:    Visit Vitals    /73    Pulse 67    Temp 97.5 °F (36.4 °C)    Resp 15    Ht 6' 2\" (1.88 m)    Wt 86.2 kg (190 lb)    SpO2 97%    BMI 24.39 kg/m2       PHYSICAL EXAM:    General:    Alert, cooperative, no distress, appears stated age. HEENT: Atraumatic, anicteric sclerae, pink conjunctivae     No oral ulcers, mucosa moist, throat clear, (+) mild right temporal area tenderness  Neck:  Supple, symmetrical,  thyroid: non tender  Lungs:   Clear to auscultation bilaterally. No Wheezing or Rhonchi. No rales. Chest wall:  No tenderness  No Accessory muscle use. Heart:   Regular  rhythm,  No  murmur   No edema  Abdomen:   Soft, non-tender. Not distended. Bowel sounds normal  Extremities: No cyanosis. No clubbing,  Skin turgor normal, Capillary refill normal, Radial dial pulse 2+  Skin:     Not pale. Not Jaundiced  No rashes   Psych:  Good insight. Not depressed. Not anxious or agitated. Neurologic: EOMs intact. No facial asymmetry. No aphasia or slurred speech. Symmetrical strength, Sensation grossly intact. Alert and oriented X 4.     _______________________________________________________________________  Care Plan discussed with:    Comments   Patient x    Family  x  wife   RN     Care Manager                    Consultant:      _______________________________________________________________________  Expected  Disposition:   Home with Family x   HH/PT/OT/RN    SNF/LTC    ARVIN    ________________________________________________________________________  TOTAL TIME: 54   Minutes    Critical Care Provided     Minutes non procedure based      Comments    x Reviewed previous records   >50% of visit spent in counseling and coordination of care  Discussion with patient and/or family and questions answered       Given the patient's current clinical presentation, I have a high level of concern for decompensation if discharged from the ED.  Complex decision making was performed which includes reviewing the patient's available past medical records, laboratory results, and Xray films. I have also directly communicated my plan and discussed this case with the involved ED physician.     ___________________________________________________________________Ti Mauro MD    Procedures: see electronic medical records for all procedures/Xrays and details which were not copied into this note but were reviewed prior to creation of Plan.     LAB DATA REVIEWED:    Recent Results (from the past 24 hour(s))   EKG, 12 LEAD, INITIAL    Collection Time: 08/01/17 10:48 AM   Result Value Ref Range    Ventricular Rate 65 BPM    Atrial Rate 65 BPM    P-R Interval 202 ms    QRS Duration 96 ms    Q-T Interval 454 ms    QTC Calculation (Bezet) 472 ms    Calculated P Axis 71 degrees    Calculated R Axis 36 degrees    Calculated T Axis 68 degrees    Diagnosis       Normal sinus rhythm  Possible Left atrial enlargement  When compared with ECG of 13-DEC-2016 06:31,  ST elevation now present in Inferior leads  ST no longer elevated in Anterior leads  T wave inversion no longer evident in Inferior leads     GLUCOSE, POC    Collection Time: 08/01/17 11:08 AM   Result Value Ref Range    Glucose (POC) 116 (H) 65 - 100 mg/dL    Performed by Jennifer Hodge    POC INR    Collection Time: 08/01/17 11:10 AM   Result Value Ref Range    INR (POC) 1.0 <1.2     POC TROPONIN-I    Collection Time: 08/01/17 11:14 AM   Result Value Ref Range    Troponin-I (POC) <0.04 0.00 - 0.08 ng/mL   CBC WITH AUTOMATED DIFF    Collection Time: 08/01/17 11:17 AM   Result Value Ref Range    WBC 9.9 4.1 - 11.1 K/uL    RBC 5.47 4.10 - 5.70 M/uL    HGB 16.0 12.1 - 17.0 g/dL    HCT 46.1 36.6 - 50.3 %    MCV 84.3 80.0 - 99.0 FL    MCH 29.3 26.0 - 34.0 PG    MCHC 34.7 30.0 - 36.5 g/dL    RDW 13.5 11.5 - 14.5 %    PLATELET 532 088 - 739 K/uL    NEUTROPHILS 76 (H) 32 - 75 %    LYMPHOCYTES 19 12 - 49 %    MONOCYTES 4 (L) 5 - 13 %    EOSINOPHILS 1 0 - 7 %    BASOPHILS 0 0 - 1 %    ABS. NEUTROPHILS 7.5 1.8 - 8.0 K/UL    ABS. LYMPHOCYTES 1.9 0.8 - 3.5 K/UL    ABS. MONOCYTES 0.4 0.0 - 1.0 K/UL    ABS. EOSINOPHILS 0.1 0.0 - 0.4 K/UL    ABS. BASOPHILS 0.0 0.0 - 0.1 K/UL   PROTHROMBIN TIME + INR    Collection Time: 08/01/17 11:17 AM   Result Value Ref Range    INR 1.0 0.9 - 1.1      Prothrombin time 10.5 9.0 - 70.0 sec   METABOLIC PANEL, COMPREHENSIVE    Collection Time: 08/01/17 11:17 AM   Result Value Ref Range    Sodium 136 136 - 145 mmol/L    Potassium 4.0 3.5 - 5.1 mmol/L    Chloride 103 97 - 108 mmol/L    CO2 26 21 - 32 mmol/L    Anion gap 7 5 - 15 mmol/L    Glucose 93 65 - 100 mg/dL    BUN 18 6 - 20 MG/DL    Creatinine 1.05 0.70 - 1.30 MG/DL    BUN/Creatinine ratio 17 12 - 20      GFR est AA >60 >60 ml/min/1.73m2    GFR est non-AA >60 >60 ml/min/1.73m2    Calcium 9.3 8.5 - 10.1 MG/DL    Bilirubin, total 0.6 0.2 - 1.0 MG/DL    ALT (SGPT) 35 12 - 78 U/L    AST (SGOT) 19 15 - 37 U/L    Alk.  phosphatase 104 45 - 117 U/L    Protein, total 8.0 6.4 - 8.2 g/dL    Albumin 3.8 3.5 - 5.0 g/dL    Globulin 4.2 (H) 2.0 - 4.0 g/dL    A-G Ratio 0.9 (L) 1.1 - 2.2     CK    Collection Time: 08/01/17 11:17 AM   Result Value Ref Range     39 - 308 U/L   TROPONIN I    Collection Time: 08/01/17 11:17 AM   Result Value Ref Range    Troponin-I, Qt. 0.05 (H) <0.05 ng/mL   ETHYL ALCOHOL    Collection Time: 08/01/17 11:17 AM   Result Value Ref Range    ALCOHOL(ETHYL),SERUM <10 <10 MG/DL   URINALYSIS W/ RFLX MICROSCOPIC    Collection Time: 08/01/17  1:04 PM   Result Value Ref Range    Color YELLOW/STRAW      Appearance CLEAR CLEAR      Specific gravity 1.019 1.003 - 1.030      pH (UA) 8.0 5.0 - 8.0      Protein NEGATIVE  NEG mg/dL    Glucose NEGATIVE  NEG mg/dL    Ketone 40 (A) NEG mg/dL    Bilirubin NEGATIVE  NEG      Blood NEGATIVE  NEG      Urobilinogen 0.2 0.2 - 1.0 EU/dL    Nitrites NEGATIVE  NEG      Leukocyte Esterase NEGATIVE  NEG     TROPONIN I Collection Time: 08/01/17  1:35 PM   Result Value Ref Range    Troponin-I, Qt. 0.05 (H) <0.05 ng/mL

## 2017-08-02 ENCOUNTER — APPOINTMENT (OUTPATIENT)
Dept: MRI IMAGING | Age: 62
DRG: 149 | End: 2017-08-02
Attending: PSYCHIATRY & NEUROLOGY
Payer: COMMERCIAL

## 2017-08-02 VITALS
OXYGEN SATURATION: 97 % | TEMPERATURE: 97.7 F | WEIGHT: 206.6 LBS | BODY MASS INDEX: 26.51 KG/M2 | HEART RATE: 69 BPM | HEIGHT: 74 IN | SYSTOLIC BLOOD PRESSURE: 116 MMHG | RESPIRATION RATE: 16 BRPM | DIASTOLIC BLOOD PRESSURE: 69 MMHG

## 2017-08-02 LAB
ANION GAP BLD CALC-SCNC: 5 MMOL/L (ref 5–15)
BASOPHILS # BLD AUTO: 0 K/UL (ref 0–0.1)
BASOPHILS # BLD: 0 % (ref 0–1)
BUN SERPL-MCNC: 22 MG/DL (ref 6–20)
BUN/CREAT SERPL: 22 (ref 12–20)
CALCIUM SERPL-MCNC: 8.1 MG/DL (ref 8.5–10.1)
CHLORIDE SERPL-SCNC: 109 MMOL/L (ref 97–108)
CHOLEST SERPL-MCNC: 129 MG/DL
CO2 SERPL-SCNC: 25 MMOL/L (ref 21–32)
CREAT SERPL-MCNC: 1 MG/DL (ref 0.7–1.3)
EOSINOPHIL # BLD: 0.2 K/UL (ref 0–0.4)
EOSINOPHIL NFR BLD: 3 % (ref 0–7)
ERYTHROCYTE [DISTWIDTH] IN BLOOD BY AUTOMATED COUNT: 14 % (ref 11.5–14.5)
ERYTHROCYTE [SEDIMENTATION RATE] IN BLOOD: 4 MM/HR (ref 0–20)
GLUCOSE SERPL-MCNC: 97 MG/DL (ref 65–100)
HCT VFR BLD AUTO: 41.9 % (ref 36.6–50.3)
HDLC SERPL-MCNC: 33 MG/DL
HDLC SERPL: 3.9 {RATIO} (ref 0–5)
HGB BLD-MCNC: 14.3 G/DL (ref 12.1–17)
LDLC SERPL CALC-MCNC: 77 MG/DL (ref 0–100)
LIPID PROFILE,FLP: NORMAL
LYMPHOCYTES # BLD AUTO: 33 % (ref 12–49)
LYMPHOCYTES # BLD: 2.5 K/UL (ref 0.8–3.5)
MCH RBC QN AUTO: 29.8 PG (ref 26–34)
MCHC RBC AUTO-ENTMCNC: 34.1 G/DL (ref 30–36.5)
MCV RBC AUTO: 87.3 FL (ref 80–99)
MONOCYTES # BLD: 0.6 K/UL (ref 0–1)
MONOCYTES NFR BLD AUTO: 8 % (ref 5–13)
NEUTS SEG # BLD: 4.3 K/UL (ref 1.8–8)
NEUTS SEG NFR BLD AUTO: 56 % (ref 32–75)
PLATELET # BLD AUTO: 143 K/UL (ref 150–400)
POTASSIUM SERPL-SCNC: 3.8 MMOL/L (ref 3.5–5.1)
RBC # BLD AUTO: 4.8 M/UL (ref 4.1–5.7)
SODIUM SERPL-SCNC: 139 MMOL/L (ref 136–145)
TRIGL SERPL-MCNC: 95 MG/DL (ref ?–150)
VLDLC SERPL CALC-MCNC: 19 MG/DL
WBC # BLD AUTO: 7.7 K/UL (ref 4.1–11.1)

## 2017-08-02 PROCEDURE — 85025 COMPLETE CBC W/AUTO DIFF WBC: CPT | Performed by: INTERNAL MEDICINE

## 2017-08-02 PROCEDURE — 93306 TTE W/DOPPLER COMPLETE: CPT

## 2017-08-02 PROCEDURE — 70544 MR ANGIOGRAPHY HEAD W/O DYE: CPT

## 2017-08-02 PROCEDURE — 80048 BASIC METABOLIC PNL TOTAL CA: CPT | Performed by: INTERNAL MEDICINE

## 2017-08-02 PROCEDURE — 97165 OT EVAL LOW COMPLEX 30 MIN: CPT

## 2017-08-02 PROCEDURE — 97161 PT EVAL LOW COMPLEX 20 MIN: CPT

## 2017-08-02 PROCEDURE — 97530 THERAPEUTIC ACTIVITIES: CPT

## 2017-08-02 PROCEDURE — 80061 LIPID PANEL: CPT | Performed by: INTERNAL MEDICINE

## 2017-08-02 PROCEDURE — 70547 MR ANGIOGRAPHY NECK W/O DYE: CPT

## 2017-08-02 PROCEDURE — 74011250637 HC RX REV CODE- 250/637: Performed by: INTERNAL MEDICINE

## 2017-08-02 PROCEDURE — 99218 HC RM OBSERVATION: CPT

## 2017-08-02 PROCEDURE — 36415 COLL VENOUS BLD VENIPUNCTURE: CPT | Performed by: INTERNAL MEDICINE

## 2017-08-02 PROCEDURE — 85652 RBC SED RATE AUTOMATED: CPT | Performed by: INTERNAL MEDICINE

## 2017-08-02 PROCEDURE — 97110 THERAPEUTIC EXERCISES: CPT

## 2017-08-02 RX ORDER — CHOLECALCIFEROL (VITAMIN D3) 125 MCG
5 CAPSULE ORAL
Qty: 30 TAB | Refills: 0 | Status: SHIPPED | OUTPATIENT
Start: 2017-08-02

## 2017-08-02 RX ORDER — MECLIZINE HYDROCHLORIDE 25 MG/1
25 TABLET ORAL
Qty: 30 TAB | Refills: 0 | Status: SHIPPED | OUTPATIENT
Start: 2017-08-02 | End: 2017-10-06

## 2017-08-02 RX ADMIN — Medication 10 ML: at 06:00

## 2017-08-02 RX ADMIN — ASPIRIN 81 MG 81 MG: 81 TABLET ORAL at 12:54

## 2017-08-02 NOTE — PROGRESS NOTES
Occupational Therapy EVALUATION/discharge  Patient: Phyllis Fraser (47 y.o. male)  Date: 8/2/2017  Primary Diagnosis: Altered mental state        Precautions:  Fall    ASSESSMENT:   Cleared by RN to see pt for OT evaluation. Based on the objective data described below, the patient presents with mildly diminished endurance and balance following admission for altered mental status and dizziness (episodes of vertigo). Pt received standing in room preparing to go on walk independently, demonstrating some impulsiveness. Reminded pt that his nurse requested he have supervision walking outside of his room due to episodes of vertigo. Pt performed functional transfers and ADLs with independence during session. UE strength and ROM WFL. No LOB or unsteadiness observed during walk in hallway or toilet transfer, pt with no c/o dizziness throughout session. Pt was alert and oriented x4, gave appropriate responses to all questions. Educated pt on fall prevention and home safety techniques. Pt lives with his wife, is previously I with ADLs, driving, and works as a . His wife was present for session. They had no questions or concerns for OT, and feel that pt is back to his baseline at this time. Pt is performing at an independent level and safe to return home to previous living situation, and anticipate no further OT needs. Will sign off at this time, please reconsult if pt's functional status changes. Further skilled acute occupational therapy is not indicated at this time. Discharge Recommendations: None  Further Equipment Recommendations for Discharge: none      SUBJECTIVE:   Patient stated I'm ready to go home.     OBJECTIVE DATA SUMMARY:   HISTORY:   Past Medical History:   Diagnosis Date    ECG abnormality 8/1/2017    Insomnia     Murmur     Valvular heart disease      Past Surgical History:   Procedure Laterality Date    HX HEART VALVE SURGERY  12/16/2016    AVR    HX HEENT      jaw sugery to get teeth lined up    HX ORTHOPAEDIC      rt foot - several surgeries on rt toes    HX ORTHOPAEDIC      several shoulder surgeries    VASCULAR SURGERY PROCEDURE UNLIST  12/16/2016    ascending aortic anerysm - repaired with graft       Prior Level of Function/Home Situation: Pt lives with his wife. Previously I with ADLs, functional mobility and driving. He works as a . Home Situation  Home Environment: Private residence  # Steps to Enter: 0  One/Two Story Residence: One story  Living Alone: No  Support Systems: Family member(s) (sons live nearby, lives with wife)  Patient Expects to be Discharged to[de-identified] Private residence  Current DME Used/Available at Home: Lucretia Squibb, straight, Shower chair, Grab bars, Raised toilet seat (knee walker )  Tub or Shower Type: Shower  [x]  Right hand dominant   []  Left hand dominant    EXAMINATION OF PERFORMANCE DEFICITS:  Cognitive/Behavioral Status:  Neurologic State: Alert  Orientation Level: Oriented X4  Cognition: Impulsive; Follows commands; Appropriate decision making  Perception: Appears intact  Perseveration: No perseveration noted  Safety/Judgement: Decreased awareness of need for safety; Awareness of environment; Fall prevention    Hearing: Auditory  Auditory Impairment: None    Vision/Perceptual:         Acuity: Able to read clock/calendar on wall without difficulty; Able to read employee name badge without difficulty    Corrective Lenses: Glasses    Range of Motion:    AROM: Within functional limits  PROM: Within functional limits       Strength:    Strength: Within functional limits        Coordination:  Coordination: Within functional limits  Fine Motor Skills-Upper: Left Intact; Right Intact    Gross Motor Skills-Upper: Left Intact; Right Intact    Tone & Sensation:  Tone: Normal  Sensation: Intact       Balance:  Sitting: Intact; Without support  Standing: Intact; Without support    Functional Mobility and Transfers for ADLs:  Bed Mobility:  Rolling: Independent  Supine to Sit: Independent  Scooting: Independent    Transfers:  Sit to Stand: Independent  Stand to Sit: Independent  Toilet Transfer : Independent    ADL Assessment:  Feeding: Independent    Oral Facial Hygiene/Grooming: Independent    Bathing: Modified independent    Upper Body Dressing: Independent    Lower Body Dressing: Independent    Toileting: Modified independent        ADL Intervention and task modifications:   Pt donned/doffed cloth lace shoes with independence sitting EOB. Reports he has been performing ADLs in room without A. He and his wife have no concerns for pt's ADL performance and functional transfers at home. Educated pt on fall prevention techniques such as not rushing and clearing obstacles from walkways. Lower Body Dressing Assistance  Shoes with Cloth Laces: Independent         Cognitive Retraining  Safety/Judgement: Decreased awareness of need for safety; Awareness of environment; Fall prevention    Functional Measure:  Barthel Index:    Bathin  Bladder: 10  Bowels: 10  Groomin  Dressing: 10  Feeding: 10  Mobility: 15  Stairs: 5  Toilet Use: 10  Transfer (Bed to Chair and Back): 15  Total: 95       Barthel and G-code impairment scale:  Percentage of impairment CH  0% CI  1-19% CJ  20-39% CK  40-59% CL  60-79% CM  80-99% CN  100%   Barthel Score 0-100 100 99-80 79-60 59-40 20-39 1-19   0   Barthel Score 0-20 20 17-19 13-16 9-12 5-8 1-4 0      The Barthel ADL Index: Guidelines  1. The index should be used as a record of what a patient does, not as a record of what a patient could do. 2. The main aim is to establish degree of independence from any help, physical or verbal, however minor and for whatever reason. 3. The need for supervision renders the patient not independent. 4. A patient's performance should be established using the best available evidence.  Asking the patient, friends/relatives and nurses are the usual sources, but direct observation and common sense are also important. However direct testing is not needed. 5. Usually the patient's performance over the preceding 24-48 hours is important, but occasionally longer periods will be relevant. 6. Middle categories imply that the patient supplies over 50 per cent of the effort. 7. Use of aids to be independent is allowed. Kat Hermosillo., Barthel, D.W. (1638). Functional evaluation: the Barthel Index. [address] [facility] (14)2. Viji jefry EDIE Mackay, Apple Harvey., Kayla Lauren., Matthew, [address] [facility] (1999). Measuring the change indisability after inpatient rehabilitation; comparison of the responsiveness of the Barthel Index and Functional [place] Measure. Journal of Neurology, Neurosurgery, and Psychiatry, 66(4), 964-456. BEKA Wright, GRAY Byrd, & Juan Bence, MDAVID. (2004.) Assessment of post-stroke quality of life in cost-effectiveness studies: The usefulness of the Barthel Index and the EuroQoL-5D. Quality of Life Research, 15, 738-83       Occupational Therapy Evaluation Charge Determination   History Examination Decision-Making   LOW Complexity : Brief history review  LOW Complexity : 1-3 performance deficits relating to physical, cognitive , or psychosocial skils that result in activity limitations and / or participation restrictions  LOW Complexity : No comorbidities that affect functional and no verbal or physical assistance needed to complete eval tasks       Based on the above components, the patient evaluation is determined to be of the following complexity level: LOW   Pain:  Pain Scale 1: Numeric (0 - 10)  Pain Intensity 1: 0     Activity Tolerance:   Excellent  Please refer to the flowsheet for vital signs taken during this treatment.   After treatment:   [x]  Patient left in no apparent distress sitting up in chair  []  Patient left in no apparent distress in bed  [x]  Call bell left within reach  [x]  Nursing notified  [x]  Caregiver present - wife  []  Bed alarm activated    COMMUNICATION/EDUCATION:   Communication/Collaboration:  [x]      Home safety education was provided and the patient/caregiver indicated understanding. [x]      Patient/family have participated as able and agree with findings and recommendations. []      Patient is unable to participate in plan of care at this time.   Findings and recommendations were discussed with: Registered Nurse    ALLYSON Clarke  Time Calculation: 14 mins

## 2017-08-02 NOTE — ROUTINE PROCESS
TRANSFER - IN REPORT:    Verbal report received from Lissy Jeffers jessy [address]  being received from ed(unit) for routine progression of care      Report consisted of patients Situation, Background, Assessment and   Recommendations(SBAR). Information from the following report(s) Kardex, ED Summary, Intake/Output, MAR and Recent Results was reviewed with the receiving nurse. Opportunity for questions and clarification was provided. Assessment completed upon patients arrival to unit and care assumed.          Primary Nurse Houston Castro RN and Edmundo Riddle RN performed a dual skin assessment on this patient No impairment noted  Paul score is 21

## 2017-08-02 NOTE — ROUTINE PROCESS
TRANSFER - OUT REPORT:    Verbal report given to Shan Cam on [facility]  being transferred to [facility] for routine progression of care       Report consisted of patients Situation, Background, Assessment and   Recommendations(SBAR). Information from the following report(s) SBAR, ED Summary, MAR, Recent Results and Cardiac Rhythm NSR was reviewed with the receiving nurse. Lines:   Peripheral IV 08/01/17 Left Antecubital (Active)   Site Assessment Clean, dry, & intact 8/1/2017 11:21 AM   Phlebitis Assessment 0 8/1/2017 11:21 AM   Infiltration Assessment 0 8/1/2017 11:21 AM   Dressing Status Clean, dry, & intact 8/1/2017 11:21 AM   Dressing Type Transparent 8/1/2017 11:21 AM   Hub Color/Line Status Flushed 8/1/2017 11:21 AM   Action Taken Blood drawn 8/1/2017 11:21 AM       Peripheral IV 08/01/17 Right Forearm (Active)   Site Assessment Clean, dry, & intact 8/1/2017 11:21 AM   Phlebitis Assessment 0 8/1/2017 11:21 AM   Infiltration Assessment 0 8/1/2017 11:21 AM   Dressing Status Clean, dry, & intact 8/1/2017 11:21 AM   Dressing Type Transparent 8/1/2017 11:21 AM   Hub Color/Line Status Flushed 8/1/2017 11:21 AM        Opportunity for questions and clarification was provided.       Patient transported with:   ED staff/transport

## 2017-08-02 NOTE — PROGRESS NOTES
Patient states he has some dizziness when he turns his head to right or left. Up with steady gait and clear speech. No complaints of pain.  Asked patient to let us know when he gets up

## 2017-08-02 NOTE — CONSULTS
NEUROLOGY CONSULTATION    DATE OF CONSULTATION: 8/2/2017  CONSULTED Latia Booker MD    REASON FOR CONSULT:Episode of confusion and vertigo      HISTORY OF PRESENT ILLNESS  Bruno Denton is a 67 y.o. male who had an episode yesterday that started with vertigo while sitting at his desk associated with diaphoresis and vomiting. [facility] and [facility] to [facility] ER. His symptoms resolved. He states he has had some funny feelings in chest but nothing abnormal on telemetry seen here. CT scan of his Brain was normal here to my eye and radiologist. He had seen ENT several days prior to admission and was put on Nortriptyline which he feels made problem worse.       PMH  Past Medical History:   Diagnosis Date    ECG abnormality 8/1/2017    Insomnia     Murmur     Valvular heart disease        SH  Social History     Social History    Marital status:      Spouse name: N/A    Number of children: N/A    Years of education: N/A     Social History Main Topics    Smoking status: Former Smoker     Packs/day: 1.00     Years: 10.00     Quit date: 1980    Smokeless tobacco: Never Used    Alcohol use No      Comment: special occasions    Drug use: No    Sexual activity: Not on file     Other Topics Concern    Not on file     Social History Narrative       FH  Family History   Problem Relation Age of Onset    Stroke Mother     Heart Failure Mother     Cancer Sister      Non-Hodgkins     Heart Disease Maternal Grandfather     Other Father      legioneres disease    Lung Disease Father      Silvia Hester Other Brother      aortic valve     Cancer Sister        ALLERGIES  No Known Allergies    CURRENT MEDS  Current Facility-Administered Medications   Medication Dose Route Frequency Provider Last Rate Last Dose    aspirin chewable tablet 81 mg  81 mg Oral DAILY Maria Elena Vega MD        suvorexant [facility]) tablet 10 mg  10 mg Oral QHS PRN Maria Elena Vega MD   10 mg at 08/01/17 2317    sodium chloride (NS) flush 5-10 mL  5-10 mL IntraVENous Q8H Jose Mendoza, MD   10 mL at 08/02/17 0600    sodium chloride (NS) flush 5-10 mL  5-10 mL IntraVENous PRN Jose Mendoza, MD        acetaminophen (TYLENOL) tablet 650 mg  650 mg Oral Q4H PRN Jose Mendoza, MD   650 mg at 08/01/17 2102    morphine injection 2 mg  2 mg IntraVENous Q3H PRN Jose Mendoza, MD        naloxone [facility]) injection 0.4 mg  0.4 mg IntraVENous PRN Jose Mendoza, MD        ondansetron [facility]) injection 4 mg  4 mg IntraVENous Q4H PRN Jose Mendoza, MD        enoxaparin (LOVENOX) injection 40 mg  40 mg SubCUTAneous Q24H Jose Mendoza, MD   40 mg at 08/01/17 2104       ROS  Constitutional: Denies recent weight change, fever, chills, sweats   ENT/Mouth:  Eye:  Denies hearing loss, ringing in the ears,   Denies vision changes,   Cardiovascular:  Respiratory:   Denies chest pain/angina pectoris  Denies shortness of breath, cough, wheezing   Gastrointestinal: Denies nausea, vomiting, constipation, frequent diarrhea,   Musculoskeletal:   Denies joint pain, stiffness/swelling   Integument:   Denies rash/itching   Neurological:  Per HPI   Psychiatric:   Denies anxiety or depression       CLINICAL DATA REVIEW  IMAGING: (I personally reviewed these images in PACS and this is my impression)  Final result (Exam End: 8/1/2017  7:14 PM) Open        Study Result             FINAL REPORT:   INDICATION:  amnesia      COMPARISON:  CT head of earlier in the day     TECHNIQUE:    MR imaging of the brain was performed including sagittal T1, axial T1, T2,  FLAIR, DWI/ADC, gradient echo; multiplanar T1-weighted imaging following the IV  injection of 17 cc ProHance. Pre and post contrast imaging was performed.     FINDINGS:     DIFFUSION: No acute infarction. VENTRICLES:  Midline, no hydrocephalus.      BRAIN PARENCHYMA/BRAINSTEM:  Few small scattered foci of FLAIR/T2 hyperintensity  in the cerebral white matter, consistent with age and probable intracranial  small vessel disease.     INTRACRANIAL HEMORRHAGE:  No acute hemorrhage. Foci of chronic microhemorrhage  in the thalami bilaterally (one in each thalamus), in the left frontal deep  white matter, and possibly in the left occipital lobe. Findings most consistent  with intracranial microangiopathy. Tr[place] BASAL CISTERNS:  Patent. FLOW VOIDS:  Normal.  POST CONTRAST:  No abnormal parenchymal or meningeal enhancement. PARANASAL SINUSES: Bilateral anterior ethmoid opacification. Right inferior  maxillary sinus retention cyst. Mild mucosal thickening versus retention cyst in  the left maxillary antrum. . Left frontoethmoidal recess opacification. CRANIOCERVICAL JUNCTION: Normal     ADDITIONAL COMMENTS:  N/A.     IMPRESSION:  1. No acute findings. 2. Few foci of chronic microhemorrhage, likely due to chronic small vessel  ischemic change. Mild white matter abnormality, also likely due to small vessel  disease. 3. Paranasal sinus inflammatory changes as described above.            TELEMETRY  No arrythmias    LABS  Results for orders placed or performed during the hospital encounter of 08/01/17   CBC WITH AUTOMATED DIFF   Result Value Ref Range    WBC 9.9 4.1 - 11.1 K/uL    RBC 5.47 4.10 - 5.70 M/uL    HGB 16.0 12.1 - 17.0 g/dL    HCT 46.1 36.6 - 50.3 %    MCV 84.3 80.0 - 99.0 FL    MCH 29.3 26.0 - 34.0 PG    MCHC 34.7 30.0 - 36.5 g/dL    RDW 13.5 11.5 - 14.5 %    PLATELET 980 033 - 554 K/uL    NEUTROPHILS 76 (H) 32 - 75 %    LYMPHOCYTES 19 12 - 49 %    MONOCYTES 4 (L) 5 - 13 %    EOSINOPHILS 1 0 - 7 %    BASOPHILS 0 0 - 1 %    ABS. NEUTROPHILS 7.5 1.8 - 8.0 K/UL    ABS. LYMPHOCYTES 1.9 0.8 - 3.5 K/UL    ABS. MONOCYTES 0.4 0.0 - 1.0 K/UL    ABS. EOSINOPHILS 0.1 0.0 - 0.4 K/UL    ABS.  BASOPHILS 0.0 0.0 - 0.1 K/UL   PROTHROMBIN TIME + INR   Result Value Ref Range    INR 1.0 0.9 - 1.1      Prothrombin time 10.5 9.0 - 04.3 sec   METABOLIC PANEL, COMPREHENSIVE   Result Value Ref Range    Sodium 136 136 - 145 mmol/L    Potassium 4.0 3.5 - 5.1 mmol/L    Chloride 103 97 - 108 mmol/L    CO2 26 21 - 32 mmol/L    Anion gap 7 5 - 15 mmol/L    Glucose 93 65 - 100 mg/dL    BUN 18 6 - 20 MG/DL    Creatinine 1.05 0.70 - 1.30 MG/DL    BUN/Creatinine ratio 17 12 - 20      GFR est AA >60 >60 ml/min/1.73m2    GFR est non-AA >60 >60 ml/min/1.73m2    Calcium 9.3 8.5 - 10.1 MG/DL    Bilirubin, total 0.6 0.2 - 1.0 MG/DL    ALT (SGPT) 35 12 - 78 U/L    AST (SGOT) 19 15 - 37 U/L    Alk. phosphatase 104 45 - 117 U/L    Protein, total 8.0 6.4 - 8.2 g/dL    Albumin 3.8 3.5 - 5.0 g/dL    Globulin 4.2 (H) 2.0 - 4.0 g/dL    A-G Ratio 0.9 (L) 1.1 - 2.2     CK   Result Value Ref Range     39 - 308 U/L   TROPONIN I   Result Value Ref Range    Troponin-I, Qt. 0.05 (H) <0.05 ng/mL   URINALYSIS W/ RFLX MICROSCOPIC   Result Value Ref Range    Color YELLOW/STRAW      Appearance CLEAR CLEAR      Specific gravity 1.019 1.003 - 1.030      pH (UA) 8.0 5.0 - 8.0      Protein NEGATIVE  NEG mg/dL    Glucose NEGATIVE  NEG mg/dL    Ketone 40 (A) NEG mg/dL    Bilirubin NEGATIVE  NEG      Blood NEGATIVE  NEG      Urobilinogen 0.2 0.2 - 1.0 EU/dL    Nitrites NEGATIVE  NEG      Leukocyte Esterase NEGATIVE  NEG     TROPONIN I   Result Value Ref Range    Troponin-I, Qt. 0.05 (H) <0.05 ng/mL   DRUG SCREEN, URINE   Result Value Ref Range    AMPHETAMINES NEGATIVE  NEG      BARBITURATES NEGATIVE  NEG      BENZODIAZEPINE NEGATIVE  NEG      COCAINE NEGATIVE  NEG      METHADONE NEGATIVE  NEG      OPIATES NEGATIVE  NEG      PCP(PHENCYCLIDINE) NEGATIVE  NEG      THC (TH-CANNABINOL) NEGATIVE  NEG      Drug screen comment (NOTE)    ETHYL ALCOHOL   Result Value Ref Range    ALCOHOL(ETHYL),SERUM <10 <10 MG/DL   SED RATE (ESR)   Result Value Ref Range    Sed rate, automated 4 0 - 20 mm/hr   CBC WITH AUTOMATED DIFF   Result Value Ref Range    WBC 7.7 4.1 - 11.1 K/uL    RBC 4.80 4. 10 - 5.70 M/uL    HGB 14.3 12.1 - 17.0 g/dL    HCT 41.9 36.6 - 50.3 %    MCV 87.3 80.0 - 99.0 FL    MCH 29.8 26.0 - 34.0 PG    MCHC 34.1 30.0 - 36.5 g/dL    RDW 14.0 11.5 - 14.5 %    PLATELET 319 (L) 496 - 400 K/uL    NEUTROPHILS 56 32 - 75 %    LYMPHOCYTES 33 12 - 49 %    MONOCYTES 8 5 - 13 %    EOSINOPHILS 3 0 - 7 %    BASOPHILS 0 0 - 1 %    ABS. NEUTROPHILS 4.3 1.8 - 8.0 K/UL    ABS. LYMPHOCYTES 2.5 0.8 - 3.5 K/UL    ABS. MONOCYTES 0.6 0.0 - 1.0 K/UL    ABS. EOSINOPHILS 0.2 0.0 - 0.4 K/UL    ABS.  BASOPHILS 0.0 0.0 - 0.1 K/UL   METABOLIC PANEL, BASIC   Result Value Ref Range    Sodium 139 136 - 145 mmol/L    Potassium 3.8 3.5 - 5.1 mmol/L    Chloride 109 (H) 97 - 108 mmol/L    CO2 25 21 - 32 mmol/L    Anion gap 5 5 - 15 mmol/L    Glucose 97 65 - 100 mg/dL    BUN 22 (H) 6 - 20 MG/DL    Creatinine 1.00 0.70 - 1.30 MG/DL    BUN/Creatinine ratio 22 (H) 12 - 20      GFR est AA >60 >60 ml/min/1.73m2    GFR est non-AA >60 >60 ml/min/1.73m2    Calcium 8.1 (L) 8.5 - 10.1 MG/DL   LIPID PANEL   Result Value Ref Range    LIPID PROFILE          Cholesterol, total 129 <200 MG/DL    Triglyceride 95 <150 MG/DL    HDL Cholesterol 33 MG/DL    LDL, calculated 77 0 - 100 MG/DL    VLDL, calculated 19 MG/DL    CHOL/HDL Ratio 3.9 0 - 5.0     GLUCOSE, POC   Result Value Ref Range    Glucose (POC) 116 (H) 65 - 100 mg/dL    Performed by [employer]    POC INR   Result Value Ref Range    INR (POC) 1.0 <1.2     POC TROPONIN-I   Result Value Ref Range    Troponin-I (POC) <0.04 0.00 - 0.08 ng/mL   EKG, 12 LEAD, INITIAL   Result Value Ref Range    Ventricular Rate 65 BPM    Atrial Rate 65 BPM    P-R Interval 202 ms    QRS Duration 96 ms    Q-T Interval 454 ms    QTC Calculation (Bezet) 472 ms    Calculated P Axis 71 degrees    Calculated R Axis 36 degrees    Calculated T Axis 68 degrees    Diagnosis       Normal sinus rhythm  Possible Left atrial enlargement  Nonspecific ST abnormality  Confirmed by Pantera Perera MD, Cary Yu ([id number]) on 8/1/2017 6:19:27 PM         PHYSICAL EXAM  Visit Vitals    /70 (BP 1 Location: Right arm, BP Patient Position: Supine)    Pulse 64    Temp 97.7 °F (36.5 °C)    Resp 16    Ht 6' 2\" (1.88 m)    Wt 206 lb 9.6 oz (93.7 kg)    SpO2 97%    BMI 26.53 kg/m2     General:  Alert, cooperative, no distress. Head:  Normocephalic, without obvious abnormality, atraumatic. Eyes:  Conjunctivae/corneas clear. Pupils equal, round, reactive to light. Extraocular movements intact, VFF, NO papilledema   Lungs:  Heart:   Non labored breathing  Regular rate and rhythm, no carotid bruits       Extremities: Extremities normal, atraumatic, no cyanosis or edema. Pulses: 2+ and symmetric all extremities. Skin: Skin color, texture, turgor normal. No rashes or lesions. Neurologic:  Gen: Attention normal             Language: naming, repetition, fluency normal             Memory: intact recent and remote memory  Cranial Nerves:  I: smell Not tested   II: visual fields Full to confrontation   II: pupils Equal, round, reactive to light   II: optic disc No papilledema   III,VII: ptosis none   III,IV,VI: extraocular muscles  Full ROM   V: mastication normal   V: facial light touch sensation  normal   VII: facial muscle function   symmetric   VIII: hearing symmetric   IX: soft palate elevation  normal   XI: trapezius strength  5/5   XI: sternocleidomastoid strength 5/5   XI: neck flexion strength  5/5   XII: tongue  midline     Motor: normal bulk and tone, no tremor              Strength: 5/5 all four extremities  Sensory: intact to LT, PP, vibration, and temperature  Coordination: FTN and HTS intact, Rhomberg negative  Gait: normal gait including tandem   Reflexes: 2+ throughout       IMPRESSION: Episode of vertigo and confusion, most likely candidates are TIA vs. Arrhythmia. I will order MRI Brain and MRA head and neck. If these are both normal then continue ASA  and monitor cardiac rythym 30 dayss    RECOMMENDATIONS:  1. MRI brain/ MRA head and carotids  3. Telemetry  4. Permissive HTN (SBP<220/<120)  5.  Stroke labs (HgbA1c, TSH, lipid panel)  6. Start ASA 81 mg daily       Thank you very much for this consultation. We will follow up on the above studies and give further recommendations as indicated. Thank you very much for this consultation. No further neurologic recommendations at this time. Will follow.

## 2017-08-02 NOTE — DISCHARGE INSTRUCTIONS
Brammo Activation    Thank you for requesting access to Brammo. Please follow the instructions below to securely access and download your online medical record. Brammo allows you to send messages to your doctor, view your test results, renew your prescriptions, schedule appointments, and more. How Do I Sign Up? 1. In your internet browser, go to [email or web address]  2. Click on the First Time User? Click Here link in the Sign In box. You will be redirect to the New Member Sign Up page. 3. Enter your Brammo Access Code exactly as it appears below. You will not need to use this code after youve completed the sign-up process. If you do not sign up before the expiration date, you must request a new code. Brammo Access Code: [email or web address]  Expires: 10/31/2017  5:11 PM (This is the date your Brammo access code will )    4. Enter the last four digits of your Social Security Number (xxxx) and Date of Birth (mm/dd/yyyy) as indicated and click Submit. You will be taken to the next sign-up page. 5. Create a Brammo ID. This will be your Brammo login ID and cannot be changed, so think of one that is secure and easy to remember. 6. Create a Brammo password. You can change your password at any time. 7. Enter your Password Reset Question and Answer. This can be used at a later time if you forget your password. 8. Enter your e-mail address. You will receive e-mail notification when new information is available in [address]. 9. Click Sign Up. You can now view and download portions of your medical record. 10. Click the Download Summary menu link to download a portable copy of your medical information. Additional Information    If you have questions, please visit the Frequently Asked Questions section of the Brammo website at [email or web address] Remember, Brammo is NOT to be used for urgent needs. For medical emergencies, dial 911.         HOSPITALIST DISCHARGE INSTRUCTIONS    NAME: Shirin Perea   :  1955   MRN:  [id number]     Date/Time:  2017 4:52 PM    ADMIT DATE: 2017     DISCHARGE DATE: 2017     DISCHARGE DIAGNOSIS:  Dizziness/Vertigo    MEDICATIONS:  · It is important that you take the medication exactly as they are prescribed. · Keep your medication in the bottles provided by the pharmacist and keep a list of the medication names, dosages, and times to be taken in your wallet. · Do not take other medications without consulting your doctor. Pain Management: per above medications    What to do at Home    Recommended diet:  Resume previous diet    Recommended activity: Activity as tolerated    If you have questions regarding the hospital related prescriptions or hospital related issues please call [facility] lesley Kirkpatrick at [phone]. If you experience any of the following symptoms then please call your primary care physician or return to the emergency room if you cannot get hold of your doctor:  Fever, chills, nausea, vomiting, diarrhea, change in mentation, falling, bleeding, shortness of breath    Follow Up:  Dr. Michel Ford MD  you are to call and set up an appointment to see them in 7-10 days. Information obtained by :  I understand that if any problems occur once I am at home I am to contact my physician. I understand and acknowledge receipt of the instructions indicated above.                                                                                                                                            Physician's or R.N.'s Signature                                                                  Date/Time                                                                                                                                              Patient or Representative Signature                                                          Date/Time

## 2017-08-02 NOTE — PROGRESS NOTES
Occupational Therapy Note    Reviewed pt chart and spoke with nurse. Pt currently off floor for an MRI. Will attempt to see pt again for OT evaluation later today.      Deandre Topete, Ese Hodges

## 2017-08-02 NOTE — PROGRESS NOTES
[address] [place] [address], [address] [facility]  [phone]      Cardiology Progress Note      8/2/2017 9:00AM    Admit Date: 8/1/2017    Admit Diagnosis:   Altered mental state    Subjective:     Muriel Hansen is feeling better today, no further dizziness. MRI with no acute changes. Neurology following and ordering MRA carotids and brain today, starting on ASA.  Telemetry shows SR no mariela/tachyarrhythmias     Visit Vitals    /70 (BP 1 Location: Right arm, BP Patient Position: Supine)    Pulse 64    Temp 97.7 °F (36.5 °C)    Resp 16    Ht 6' 2\" (1.88 m)    Wt 93.7 kg (206 lb 9.6 oz)    SpO2 97%    BMI 26.53 kg/m2       Current Facility-Administered Medications   Medication Dose Route Frequency    aspirin chewable tablet 81 mg  81 mg Oral DAILY    suvorexant (BELSOMRA) tablet 10 mg  10 mg Oral QHS PRN    sodium chloride (NS) flush 5-10 mL  5-10 mL IntraVENous Q8H    sodium chloride (NS) flush 5-10 mL  5-10 mL IntraVENous PRN    acetaminophen (TYLENOL) tablet 650 mg  650 mg Oral Q4H PRN    morphine injection 2 mg  2 mg IntraVENous Q3H PRN    naloxone (NARCAN) injection 0.4 mg  0.4 mg IntraVENous PRN    ondansetron (ZOFRAN) injection 4 mg  4 mg IntraVENous Q4H PRN    enoxaparin (LOVENOX) injection 40 mg  40 mg SubCUTAneous Q24H       Objective:      Physical Exam:  General Appearance:  WNWD  male in no acute distress  Chest:   Clear  Cardiovascular:  Regular rate and rhythm, no murmur.   Abdomen:   Soft, non-tender, bowel sounds are active.   Extremities: no peripheral edema  Skin:  Warm and dry.     Data Review:   Recent Labs      08/02/17   0232  08/01/17   1117   WBC  7.7  9.9   HGB  14.3  16.0   HCT  41.9  46.1   PLT  143*  165     Recent Labs      08/02/17   0232  08/01/17   1117  08/01/17   1110   NA  139  136   --    K  3.8  4.0   --    CL  109*  103   --    CO2  25  26   --    GLU  97  93   --    BUN  22*  18   --    CREA  1.00  1.05   --    CA  8.1*  9.3   -- ALB   --   3.8   --    TBILI   --   0.6   --    SGOT   --   19   --    ALT   --   35   --    INR   --   1.0  1.0       Recent Labs      08/01/17   1335  08/01/17   1117   TROIQ  0.05*  0.05*   CPK   --   124         Intake/Output Summary (Last 24 hours) at 08/02/17 1100  Last data filed at 08/02/17 0757   Gross per 24 hour   Intake              360 ml   Output              200 ml   Net              160 ml        Telemetry: SR    Assessment:     Active Problems:    Ascending aortic aneurysm ([address] (12/5/2016)      S/P AVR (12/12/2016)      Overview: 1. AORTIC VALVE REPLACEMENT USING A 25 MM [facility] BIOPROSTHESIS, ECC      2. REPAIR OF ASCENDING ANEURYSM USING A 24MM STRAIGHT HEMASHIELD GRAFT      ECG abnormality (8/1/2017)      Altered mental state (8/1/2017)        Plan:       ECG abnormality:  Not a STEMI, insignificant changes   Cardiac cath in Oct 2016 pre-AVR without obstructive disease  Continue on ASA, and antilipid medications  No BB or other antiHTN medications as these were stopped in the spring due to the c/o dizziness and possible relationship to low BP     Concerns that with hx of AVR, could there be small emboli that could be causing these symptoms? Could he be experiencing significant bradycardia or other arrhythmia? Echo still pending  Plan for outpt Event monitor for 30 days to check arrhythmia. Neurology following. Pt seen and examined in details. Agree with NP A&P. Clinically his symptoms does not appear to be cardiac related. Echo noted. No dysrhythmias so far. Will make arrangement for out pt event monitor. No further cardiac work up. Ok to [employer] from cardiac standpoint, once clear by neurology and hospitalist service.      Fernanda Artis MD

## 2017-08-02 NOTE — PROGRESS NOTES
Patient discharged. I went over all follow up and medications and side effects of new meds. Patient is ready to go and has no complaints.

## 2017-08-02 NOTE — PROGRESS NOTES
physical Therapy EVALUATION/DISCHARGE  Patient: Shirin Perea (61 y.o. male)  Date: 8/2/2017  Primary Diagnosis: Altered mental state        Precautions:     ASSESSMENT :  Based on the objective data described below, the patient presents with mild c/o vertiginous symptoms with head turns and smooth pursuits, VOR x1 and 2. MRI testing negative. This does not however impact his functional mobility as he remains at an independent level of mobility. He is at a low fall risk per formal testing (FGA, Romberg, SLS tests). However, given high functioning, and deficits noted above, he still remains a candidate for further PT intervention in the OP vestibular PT environment. Patient provided with habituation exercises (VOR x1 and x2) in handout form to perform ahead. Skilled physical therapy is not indicated at this time. PLAN :  Discharge Recommendations: Outpatient VESTIBULAR PT   Further Equipment Recommendations for Discharge: none     SUBJECTIVE:   Patient stated It gets me when I look from screen to screen.     OBJECTIVE DATA SUMMARY:   HISTORY:    Past Medical History:   Diagnosis Date    ECG abnormality 8/1/2017    Insomnia     Murmur     Valvular heart disease      Past Surgical History:   Procedure Laterality Date    HX HEART VALVE SURGERY  12/16/2016    AVR    HX HEENT      jaw sugery to get teeth lined up    HX ORTHOPAEDIC      rt foot - several surgeries on rt toes    HX ORTHOPAEDIC      several shoulder surgeries    VASCULAR SURGERY PROCEDURE UNLIST  12/16/2016    ascending aortic anerysm - repaired with graft     Prior Level of Function/Home Situation: Independent, no falls in the past year, driving, working as a , living with supportive spouse. He reports vertigo occurs mainly in the morning, often with bending forwards or upwards, and rarely with turning in the bed. It also affects him with head turns at times while ambulating.  He has seen an ENT for this, after noting symptoms occurred starting since his AVR this past year. He has not been to OP PT for this. Personal factors and/or comorbidities impacting plan of care:     Home Situation  Home Environment: Private residence  # Steps to Enter: 0  One/Two Story Residence: One story  Living Alone: No  Support Systems: Family member(s)  Patient Expects to be Discharged to[de-identified] Private residence  Current DME Used/Available at Home: None  Tub or Shower Type: Shower    EXAMINATION/PRESENTATION/DECISION MAKING:   Critical Behavior:  Neurologic State: Alert  Orientation Level: Oriented X4  Cognition: Appropriate decision making  Safety/Judgement: Awareness of environment  Hearing: Auditory  Auditory Impairment: None   Range Of Motion:  AROM: Within functional limits                       Strength:    Strength: Within functional limits                    Tone & Sensation:   Tone: Normal              Sensation: Intact               Coordination:  Coordination: Within functional limits  Vision:   Corrective Lenses: Glasses (bifocals progressive lenses )  Functional Mobility:  Bed Mobility:  Rolling: Independent  Supine to Sit: Independent     Scooting: Independent  Transfers:  Sit to Stand: Independent  Stand to Sit: Independent                       Balance:   Sitting: Intact; Without support  Standing: Intact; Without support  Ambulation/Gait Training:  Distance (ft): 400 Feet (ft)     Ambulation - Level of Assistance: Independent     Gait Description (WDL): Exceptions to WDL                                           No concerns. Stairs:  Number of Stairs Trained: 12  Stairs - Level of Assistance: Independent   Rail Use: None  Functional Measure:  SLS test  Right Leg Stance-Unsupported : 20 Seconds     Left Leg Stance-Unsupported: 4 Seconds  Romberg: Pass     Romberg:    Romberg: Pass         This test assesses the ability of the vestibular apparatus in the inner ear to help maintain standing balance.   Test is positive if the patient sways or falls while their eyes are closed. (Reference - Physical Examination & Health Assessment (4th edition) written by Delia Torrez)       Functional Gait Assessment:    Gait Level Surface: Normal  Change In Gait Speed: Normal  Gait With Horizontal Head Turns: Normal  Gait With Vertical Head Turns: Normal  Gait And Pivot Turn: Normal  Step Over Obstacle: Normal  Gait With Narrow Base Of Support: Normal  Gait With Eyes Closed: Normal  Ambulating Backwards: Normal  Steps: Normal  Score: 30     Functional Gait Assessment and G-code impairment scale:  Percentage of Impairment CH    0%   CI    1-19% CJ    20-39% CK    40-59% CL    60-79% CM    80-99% CN     100%   Functional Gait  Score 0-30 30 25-29 19-24 13-18 7-12 1-6 0       Maximum Score 30   £ 22/30 classifies fall risk in older adults and predicts unexplained falls in community-dwelling older adults  Age: \"Normal\" score:   Up to 60 >27/30   60-80 >24/30   Over [de-identified] >19/30   Tatyana Cantrell N. \"Functional Gait Assessment: concurrent discriminative, and predictive validity in community-dwelling older adults. \" Physical Therapy 90 (5) 2010. G codes: In compliance with CMSs Claims Based Outcome Reporting, the following G-code set was chosen for this patient based on their primary functional limitation being treated: The outcome measure chosen to determine the severity of the functional limitation was the FGA with a score of 30/30 which was correlated with the impairment scale. ? Mobility - Walking and Moving Around:     - CURRENT STATUS: CH - 0% impaired, limited or restricted    - GOAL STATUS: CH - 0% impaired, limited or restricted    - D/C STATUS:  CH - 0% impaired, limited or restricted     Pain:Pain Scale 1: Numeric (0 - 10)  Pain Intensity 1: 0     Activity Tolerance: WNL    Please refer to the flowsheet for vital signs taken during this treatment.   After treatment:   [x]   Patient left in no apparent distress sitting up in chair  []   Patient left in no apparent distress in bed  [x]   Call bell left within reach  [x]   Nursing notified  [x]   Caregiver present  []   Bed alarm activated    COMMUNICATION/EDUCATION:   Communication/Collaboration:  [x]   Fall prevention education was provided and the patient/caregiver indicated understanding. [x]   Patient/family have participated as able and agree with findings and recommendations. []   Patient is unable to participate in plan of care at this time.   Findings and recommendations were discussed with: Registered Nurse    Thank you for this referral.  Panda Archer, PT, DPT    Time Calculation: 57 mins

## 2017-08-02 NOTE — PROGRESS NOTES
Spiritual Care Partner Volunteer visited patient in Cardiopulmonary on 8/2/17.   Documented by:  Indy Felton [address] [facility] [address] ([id number])

## 2017-08-02 NOTE — DISCHARGE SUMMARY
Hospitalist Discharge Summary     Patient ID:  Miladis Herrera  [id number]  16 y.o.  1955    PCP on record: Pascual Bonner MD    Admit date: 8/1/2017  Discharge date and time: 8/2/2017      DISCHARGE DIAGNOSIS:  Dizziness/Vertigo    CONSULTATIONS:  IP CONSULT TO NEUROLOGY  IP CONSULT TO HOSPITALIST  IP CONSULT TO CARDIOLOGY    Excerpted HPI from H&P of Mayda Mccabe MD:  Ceasar Brock is a 60 y.o.  male with a history of valvular heart disease, s/p bovine bioprosthetic AVR and ascending aortic aneurysm repair last 12/2016 who presents with symptoms of HA, dizziness and confusion. Patient has had intermittent headaches in the past associated with vision changes (seeing lights) but since his heart surgery, he has experienced intermittent dizziness, vertigo, gait imbalance and nausea / vomiting. He has seen Dr Lm Schwartz (ENT) and was started on Nortriptyline 4 days ago. Since starting this new med, he felt that his symptoms have gotten worse. He was at work today when he suddenly experienced severe vertigo, headaches and nausea. He broke out in a cold sweat and felt like he could not even safely stand up so he called his wife to take him home. En route home, the patient started acting confused and reported that he feels like passing out so his wife drive him to the nearest [facility].   He eventually was referred to this ER via EMS. Initial presentation was for possible STEMI but cardiology later cleared. CT head negative. Tele neurology recommended MRI and admission for AMS and work up for dizziness. We were asked to admit for work up and evaluation of the above problems. ______________________________________________________________________  DISCHARGE SUMMARY/HOSPITAL COURSE:  for full details see H&P, daily progress notes, labs, consult notes. 60year old admitted with Dizziness/Vertigo to rule out stroke. Pt has had these recurrent episodes.  Stroke ruled out by negative MRI and MRA neck, 2D echo with normal EF. Neuro recommended to continue his ASA. Cardiology also evaluated the patient and want to rule out arrhythmias considering history of AVR and recommended to have OP 30 days event monitor to be placed as an placement. Pt seems to be on Belsomra which can rarely contribute to Dizzy spells specially pt complaints the spells mostly in morning and episodes started after few months after starting the meds. I have recommended him to try melatonin instead. Will give him referral for OP PT for vestibular therapy and PRN meclizine. He claims to have been worked up by ENT for the symptoms in the past.        _______________________________________________________________________  Patient seen and examined by me on discharge day. Pertinent Findings:  Gen:    Not in distress  Chest: Clear lungs  CVS:   Regular rhythm. No edema  Abd:  Soft, not distended, not tender  Neuro:  Alert, non focal  _______________________________________________________________________  DISCHARGE MEDICATIONS:   Current Discharge Medication List      START taking these medications    Details   meclizine (ANTIVERT) 25 mg tablet Take 1 Tab by mouth three (3) times daily as needed. For dizziness  Qty: 30 Tab, Refills: 0      melatonin tab tablet Take 1 Tab by mouth nightly. Qty: 30 Tab, Refills: 0      OTHER,NON-FORMULARY, Vestibular Physical Therapy  Please Eval and Treat   Dx: Vertigo  Qty: 1 Each, Refills: 0         CONTINUE these medications which have NOT CHANGED    Details   ibuprofen (ADVIL) 200 mg tablet Take 200 mg by mouth every six (6) hours as needed. 400mg 1-2 times a day PRN      aspirin 81 mg chewable tablet Take 1 Tab by mouth daily for 360 days. Qty: 30 Tab, Refills: 11      omega-3 fatty acids-vitamin e (FISH OIL) 1,000 mg cap Take 1 Cap by mouth daily. glucosamine-chondroitin (ARTHX) 500-400 mg cap Take 2 Caps by mouth daily.       B.infantis-B.ani-B.long-B.bifi (PROBIOTIC 4X) 10-15 mg TbEC Take 1 Tab by mouth daily. testosterone (TESTIM) 50 mg/5 gram (1 %) gel 5 g by TransDERmal route daily. STOP taking these medications       suvorexant (BELSOMRA) 10 mg tablet Comments:   Reason for Stopping:               My Recommended Diet, Activity, Wound Care, and follow-up labs are listed in the patient's Discharge Insturctions which I have personally completed and reviewed. _______________________________________________________________________  DISPOSITION:    Home with Family: y   Home with HH/PT/OT/RN:    SNF/LTC:    ARVIN:    OTHER:        Condition at Discharge:  Stable  _______________________________________________________________________  Follow up with:   PCP : Ra Damian MD  Follow-up Information     Follow up With Details Comments Shahram LARIOS MD   [address] [place] [address]      Sadie Alfred MD  Call and schadule for 30 days event monitor.  Also make an appointment to see Dr Robby Flaherty after a months once that has been removed to discussed the results [address] [facility]  [phone]                Total time in minutes spent coordinating this discharge (includes going over instructions, follow-up, prescriptions, and preparing report for sign off to her PCP) :  35 minutes    Signed:  Sanju Laura MD

## 2017-08-08 ENCOUNTER — TELEPHONE (OUTPATIENT)
Dept: CARDIOLOGY CLINIC | Age: 62
End: 2017-08-08

## 2017-08-08 NOTE — TELEPHONE ENCOUNTER
Left message to return my call. Left 2nd message to return my call. Left 3rd message to return my call. Pt is due to come in on 8/23/17 to see Javi Resendiz our NP. Will wait and see if pt comes in to see us in office and have his questions answered as I cannot get a hold of pt.

## 2017-08-17 NOTE — PHYSICIAN ADVISORY
Dear. Seven Cervantes :     I have reviewed the following case on 8/17/2017 :        Pt Name:  Felisha Low   MR#  [id number]   CSN#   [id number]   Admit date  8/1/2017 10:47 AM   Discharge date  8/2/2017   Discharging doctor  Thompson Boo att. providers found   [place] diagnosis  <principal problem not specified>        Insurance  Payor: BLUE CROSS / Plan: [facility] PPO / Product Type: PPO /      Clinicals    60 y.o. male who was hospitalized for a day with complaints of Dizziness/Vertigo  CVA was ruled out and pt was sent home with advice for OP event monitor.         Danelle  TIA    Decision Please accept OBSERVATION    Additional comments         Sincerely    Lew Shukla MD MPH FACP     Physician Advisor  [address]    President Medical Staff, [facility]    Cell  [phone]

## 2017-08-23 ENCOUNTER — OFFICE VISIT (OUTPATIENT)
Dept: CARDIOLOGY CLINIC | Age: 62
End: 2017-08-23

## 2017-08-23 VITALS
SYSTOLIC BLOOD PRESSURE: 106 MMHG | WEIGHT: 208.6 LBS | HEART RATE: 65 BPM | DIASTOLIC BLOOD PRESSURE: 72 MMHG | RESPIRATION RATE: 16 BRPM | HEIGHT: 74 IN | BODY MASS INDEX: 26.77 KG/M2 | OXYGEN SATURATION: 97 %

## 2017-08-23 DIAGNOSIS — R42 DIZZINESS: Primary | ICD-10-CM

## 2017-08-23 DIAGNOSIS — R55 NEAR SYNCOPE: Primary | ICD-10-CM

## 2017-08-23 DIAGNOSIS — Z95.2 S/P AVR: ICD-10-CM

## 2017-08-23 DIAGNOSIS — R42 DIZZINESS: ICD-10-CM

## 2017-08-23 NOTE — PROGRESS NOTES
Chief Complaint   Patient presents with    Other     dizziness, headaches, comes close to passing out

## 2017-08-23 NOTE — PROGRESS NOTES
Seven Titus DNP, ANP-BC  Subjective/HPI:     Lazarus Jesus is a 60 y.o. male is here for hospital follow-up where he was admitted for headache dizziness altered mental status. Seen by cardiology and has been recommended for outpatient follow-up for 30 day monitoring with possible implantable loop recorder. Since hospital discharge patient continues to have some slight episodes of lightheadedness dizziness was most notable while he was looking at an electronic device. Denies chest pain shortness of breath dyspnea on exertion or fatigue. Hospitalist Note  Excerpted HPI from H&P of Mu Estrella MD:  Alec Rueda is a 60 y.o.   male with a history of valvular heart disease, s/p bovine bioprosthetic AVR and ascending aortic aneurysm repair last 12/2016 who presents with symptoms of HA, dizziness and confusion.   Patient has had intermittent headaches in the past associated with vision changes (seeing lights) but since his heart surgery, he has experienced intermittent dizziness, vertigo, gait imbalance and nausea / vomiting.   He has seen Dr Herbert Butler (ENT) and was started on Nortriptyline 4 days ago. Cole Math starting this new med, he felt that his symptoms have gotten worse. Olivia Morris was at work today when he suddenly experienced severe vertigo, headaches and nausea. Marianna Arreola broke out in a cold sweat and felt like he could not even safely stand up so he called his wife to take him home.  En route home, the patient started acting confused and reported that he feels like passing out so his wife drive him to the nearest Patient Bryan Santiago eventually was referred to this ER via EMS.    Initial presentation was for possible STEMI but cardiology later cleared.   CT head negative.  Tele neurology recommended MRI and admission for AMS and work up for dizziness. We were asked to admit for work up and evaluation of the above problems. ______________________________________________________________________  DISCHARGE SUMMARY/HOSPITAL COURSE:  for full details see H&P, daily progress notes, labs, consult notes.      60year old admitted with Dizziness/Vertigo to rule out stroke. Pt has had these recurrent episodes. Stroke ruled out by negative MRI and MRA neck, 2D echo with normal EF. Neuro recommended to continue his ASA. Cardiology also evaluated the patient and want to rule out arrhythmias considering history of AVR and recommended to have OP 30 days event monitor to be placed as an placement. Pt seems to be on Belsomra which can rarely contribute to Dizzy spells specially pt complaints the spells mostly in morning and episodes started after few months after starting the meds. I have recommended him to try melatonin instead. Will give him referral for OP PT for vestibular therapy and PRN meclizine. He claims to have been worked up by ENT for the symptoms in the past.       Imaging:  MRA NECK  Images were obtained in the region of the carotid bifurcations. . The distal  common carotid arteries, carotid bifurcations, and internal carotid arteries are  bilaterally patent. Lavada Saucer NASCET method was utilized for calculating stenosis. The  vertebral arteries are bilaterally patent and the left is dominant. .     MRA HEAD  The basilar artery and its branches are patent. . The internal carotid, anterior  cerebral, and middle cerebral arteries are patent. There is no flow-limiting  intracranial stenosis. There is no aneurysm. There is a patent right posterior  communicating artery. .     CT:  FINDINGS:  The ventricles and sulci are normal in size, shape and configuration and  midline. There is no significant white matter disease. There is no intracranial  hemorrhage, extra-axial collection, mass, mass effect or midline shift. The  basilar cisterns are open. No acute infarct is identified. The bone windows  demonstrate no abnormalities.  Retention cysts are seen in the maxillary sinuses  bilaterally. Mucoperiosteal thickening is noted in the ethmoid layer cells and  frontal sinuses bilaterally.     IMPRESSION  IMPRESSION: No acute abnormality. ECHO    SUMMARY:  Left ventricle: Systolic function was normal. Ejection fraction was  estimated in the range of 55 % to 60 %. There were no regional wall motion  abnormalities. Wall thickness was mildly increased. Aortic valve: A bioprosthesis was present. It exhibited normal function. Patient reports he has been seen by ENT/balance disorder clinic with negative findings. Had trialed Antivert with no improvement in symptoms       PCP Provider  Herbert Coon MD  Past Medical History:   Diagnosis Date    ECG abnormality 8/1/2017    Insomnia     Murmur     Valvular heart disease       Past Surgical History:   Procedure Laterality Date    HX HEART VALVE SURGERY  12/16/2016    AVR    HX HEENT      jaw sugery to get teeth lined up    HX ORTHOPAEDIC      rt foot - several surgeries on rt toes    HX ORTHOPAEDIC      several shoulder surgeries    VASCULAR SURGERY PROCEDURE UNLIST  12/16/2016    ascending aortic anerysm - repaired with graft     No Known Allergies   Family History   Problem Relation Age of Onset    Stroke Mother     Heart Failure Mother     Cancer Sister      Non-Hodgkins     Heart Disease Maternal Grandfather     Other Father      legioneres disease    Lung Disease Father      Flora Jones Other Brother      aortic valve     Cancer Sister       Current Outpatient Prescriptions   Medication Sig    melatonin tab tablet Take 1 Tab by mouth nightly. [facility], [facility]  Please Eval and Treat   Dx: Vertigo    ibuprofen (ADVIL) 200 mg tablet Take 200 mg by mouth every six (6) hours as needed. 400mg 1-2 times a day PRN    aspirin 81 mg chewable tablet Take 1 Tab by mouth daily for 360 days.     omega-3 fatty acids-vitamin e (FISH OIL) 1,000 mg cap Take 1 Cap by mouth daily.  glucosamine-chondroitin (ARTHX) 500-400 mg cap Take 2 Caps by mouth daily.  B.infantis-B.ani-B.long-B.bifi (PROBIOTIC 4X) 10-15 mg TbEC Take 1 Tab by mouth daily.  testosterone (TESTIM) 50 mg/5 gram (1 %) gel 5 g by TransDERmal route daily.  meclizine (ANTIVERT) 25 mg tablet Take 1 Tab by mouth three (3) times daily as needed. For dizziness     No current facility-administered medications for this visit. Vitals:    08/23/17 1414 08/23/17 1428   BP: 110/76 106/72   Pulse: 65    Resp: 16    SpO2: 97%    Weight: 208 lb 9.6 oz (94.6 kg)    Height: 6' 2\" (1.88 m)      Social History     Social History    Marital status:      Spouse name: N/A    Number of children: N/A    Years of education: N/A     Occupational History    Not on file. Social History Main Topics    Smoking status: Former Smoker     Packs/day: 1.00     Years: 10.00     Quit date: 1980    Smokeless tobacco: Never Used    Alcohol use No      Comment: special occasions    Drug use: No    Sexual activity: Not on file     Other Topics Concern    Not on file     Social History Narrative       I have reviewed the nurses notes, vitals, problem list, allergy list, medical history, family, social history and medications. Review of Symptoms:    General: Pt denies excessive weight gain or loss. Pt is able to conduct ADL's  HEENT: Denies blurred vision, headaches, epistaxis and difficulty swallowing. Respiratory: Denies shortness of breath, BRICE, wheezing or stridor. Cardiovascular: Denies precordial pain, palpitations, edema or PND  Gastrointestinal: Denies poor appetite, indigestion, abdominal pain or blood in stool  Musculoskeletal: Denies pain or swelling from muscles or joints  Neurologic: Denies tremor, paresthesias. Occasional lightheadedness blurred vision and previous reports of near syncope. Skin: Denies rash, itching or texture change.       Physical Exam:      General: Well developed, in no acute distress, cooperative and alert  HEENT: No carotid bruits, no JVD, trach is midline. Neck Supple, PEERL, EOM intact. Heart:  Normal S1/S2 negative S3 or S4. Regular, 1/6 systolic murmur, gallop or rub.   Respiratory: Clear bilaterally x 4, no wheezing or rales  Abdomen:   Soft, non-tender, no masses, bowel sounds are active.   Extremities:  No edema, normal cap refill, no cyanosis, atraumatic. Neuro: A&Ox3, speech clear, gait stable. Skin: Skin color is normal. No rashes or lesions.  Non diaphoretic  Vascular: 2+ pulses symmetric in all extremities    Cardiographics    ECG: Sinus rhythm  Results for orders placed or performed during the hospital encounter of 08/01/17   EKG, 12 LEAD, INITIAL   Result Value Ref Range    Ventricular Rate 65 BPM    Atrial Rate 65 BPM    P-R Interval 202 ms    QRS Duration 96 ms    Q-T Interval 454 ms    QTC Calculation (Bezet) 472 ms    Calculated P Axis 71 degrees    Calculated R Axis 36 degrees    Calculated T Axis 68 degrees    Diagnosis       Normal sinus rhythm  Possible Left atrial enlargement  Nonspecific ST abnormality  Confirmed by Antoine Zaldivar MD, Tonye Apley ([id number]) on 8/1/2017 6:19:27 PM           Cardiology Labs:  Lab Results   Component Value Date/Time    Cholesterol, total 129 08/02/2017 02:32 AM    HDL Cholesterol 33 08/02/2017 02:32 AM    LDL, calculated 77 08/02/2017 02:32 AM    Triglyceride 95 08/02/2017 02:32 AM    CHOL/HDL Ratio 3.9 08/02/2017 02:32 AM       Lab Results   Component Value Date/Time    Sodium 139 08/02/2017 02:32 AM    Potassium 3.8 08/02/2017 02:32 AM    Chloride 109 08/02/2017 02:32 AM    CO2 25 08/02/2017 02:32 AM    Anion gap 5 08/02/2017 02:32 AM    Glucose 97 08/02/2017 02:32 AM    BUN 22 08/02/2017 02:32 AM    Creatinine 1.00 08/02/2017 02:32 AM    BUN/Creatinine ratio 22 08/02/2017 02:32 AM    GFR est AA >60 08/02/2017 02:32 AM    GFR est non-AA >60 08/02/2017 02:32 AM    Calcium 8.1 08/02/2017 02:32 AM    Bilirubin, total 0.6 08/01/2017 11:17 AM AST (SGOT) 19 08/01/2017 11:17 AM    Alk. phosphatase 104 08/01/2017 11:17 AM    Protein, total 8.0 08/01/2017 11:17 AM    Albumin 3.8 08/01/2017 11:17 AM    Globulin 4.2 08/01/2017 11:17 AM    A-G Ratio 0.9 08/01/2017 11:17 AM    ALT (SGPT) 35 08/01/2017 11:17 AM           Assessment:     Assessment:     Diagnoses and all orders for this visit:    1. Near syncope    2. Dizziness  -     AMB POC EKG ROUTINE W/ 12 LEADS, INTER & REP    3. S/P AVR        ICD-10-CM ICD-9-CM    1. Near syncope R55 780.2    2. Dizziness R42 780.4 AMB POC EKG ROUTINE W/ 12 LEADS, INTER & REP   3. S/P AVR Z95.2 V43.3      Orders Placed This Encounter    AMB POC EKG ROUTINE W/ 12 LEADS, INTER & REP     Order Specific Question:   Reason for Exam:     Answer:   routine        Plan:     Patient is a 26-year-old male with episodes of dizziness, near syncope, blurred vision and headaches with normal CT scan of brain, normal MRI brain and carotids. From cardiology standpoint bioprosthetic aVR functioning normal on echo. Rule out arrhythmia with event monitor may need to consider implantable loop recorder for longer duration of monitoring. Follow-up with Dr. Tony Cota after results of event monitor.     Leti Scott NP

## 2017-08-23 NOTE — LETTER
8/23/2017 4:40 PM 
 
Patient:  Pratima Last YOB: 1955 Date of Visit: 8/23/2017 Dear Josesito Oro MD 
[address] [address] VIA Facsimile: [phone] 
 : Thank you for referring Mr. Mil Aguirre to me for evaluation/treatment. Below are the relevant portions of my assessment and plan of care. If you have questions, please do not hesitate to call me. I look forward to following Mr. Rueda along with you. Sincerely, Karen Devries NP

## 2017-09-11 ENCOUNTER — TELEPHONE (OUTPATIENT)
Dept: CARDIOLOGY CLINIC | Age: 62
End: 2017-09-11

## 2017-09-11 NOTE — TELEPHONE ENCOUNTER
----- Message from John Edmondson MD sent at 9/11/2017 12:23 PM EDT -----  Inform him monitor is ok.

## 2017-09-12 NOTE — TELEPHONE ENCOUNTER
Called pt,verified pt with two pt identifiers, told pt his monitor is okay per . He verbalized that he understood everything.

## 2017-09-21 ENCOUNTER — OFFICE VISIT (OUTPATIENT)
Dept: CARDIOLOGY CLINIC | Age: 62
End: 2017-09-21

## 2017-09-21 VITALS
SYSTOLIC BLOOD PRESSURE: 122 MMHG | BODY MASS INDEX: 26.23 KG/M2 | RESPIRATION RATE: 16 BRPM | DIASTOLIC BLOOD PRESSURE: 84 MMHG | HEIGHT: 74 IN | OXYGEN SATURATION: 95 % | HEART RATE: 72 BPM | WEIGHT: 204.4 LBS

## 2017-09-21 DIAGNOSIS — Z95.2 S/P AVR: ICD-10-CM

## 2017-09-21 DIAGNOSIS — I71.21 ASCENDING AORTIC ANEURYSM: ICD-10-CM

## 2017-09-21 DIAGNOSIS — I35.0 NONRHEUMATIC AORTIC VALVE STENOSIS: Primary | ICD-10-CM

## 2017-09-21 NOTE — PROGRESS NOTES
9/21/2017 11:44 AM      Subjective:     Felisha Low is here for f/u visit. Seen by NP for dizziness and palpitation. Event monitor placed. No dysrhythmias noted. Feels great now and denies any symptoms. Visit Vitals    /84 (BP 1 Location: Left arm, BP Patient Position: Sitting)    Pulse 72    Resp 16    Ht 6' 2\" (1.88 m)    Wt 204 lb 6.4 oz (92.7 kg)    SpO2 95%    BMI 26.24 kg/m2     Current Outpatient Prescriptions   Medication Sig    melatonin tab tablet Take 1 Tab by mouth nightly.  ibuprofen (ADVIL) 200 mg tablet Take 200 mg by mouth every six (6) hours as needed. 400mg 1-2 times a day PRN    aspirin 81 mg chewable tablet Take 1 Tab by mouth daily for 360 days.  omega-3 fatty acids-vitamin e (FISH OIL) 1,000 mg cap Take 1 Cap by mouth daily.  glucosamine-chondroitin (ARTHX) 500-400 mg cap Take 2 Caps by mouth daily.  B.infantis-B.ani-B.long-B.bifi (PROBIOTIC 4X) 10-15 mg TbEC Take 1 Tab by mouth daily.  testosterone (TESTIM) 50 mg/5 gram (1 %) gel 5 g by TransDERmal route daily.  meclizine (ANTIVERT) 25 mg tablet Take 1 Tab by mouth three (3) times daily as needed. For dizziness   [facility], V[facility]ibular Physical [facility]  Please Eval and Treat   Dx: Vertigo     No current facility-administered medications for this visit.           Objective:      Visit Vitals    /84 (BP 1 Location: Left arm, BP Patient Position: Sitting)    Pulse 72    Resp 16    Ht 6' 2\" (1.88 m)    Wt 204 lb 6.4 oz (92.7 kg)    SpO2 95%    BMI 26.24 kg/m2       Data Review:     Reviewed and/or ordered active problem list, medication list tests    Past Medical History:   Diagnosis Date    ECG abnormality 8/1/2017    Insomnia     Murmur     Valvular heart disease       Past Surgical History:   Procedure Laterality Date    HX HEART VALVE SURGERY  12/16/2016    AVR    HX HEENT      jaw sugery to get teeth lined up    HX ORTHOPAEDIC      rt foot - several surgeries on rt toes    HX ORTHOPAEDIC      several shoulder surgeries    VASCULAR SURGERY PROCEDURE UNLIST  12/16/2016    ascending aortic anerysm - repaired with graft     No Known Allergies   Family History   Problem Relation Age of Onset    Stroke Mother     Heart Failure Mother     Cancer Sister      Non-Hodgkins     Heart Disease Maternal Grandfather     Other Father      legioneres disease    Lung Disease Father      Flora Jones Other Brother      aortic valve     Cancer Sister       Social History     Social History    Marital status:      Spouse name: N/A    Number of children: N/A    Years of education: N/A     Occupational History    Not on file. Social History Main Topics    Smoking status: Former Smoker     Packs/day: 1.00     Years: 10.00     Quit date: 1980    Smokeless tobacco: Never Used    Alcohol use No      Comment: special occasions    Drug use: No    Sexual activity: Not on file     Other Topics Concern    Not on file     Social History Narrative       Review of Systems   General: Not Present- Dietary Changes, Fatigue and Medication Changes. Skin: Not Present- Bruising and Excessive Sweating. HEENT: Not Present- Headache, Visual Loss and Vertigo. Respiratory: Not Present- Cough, Decreased Exercise Tolerance and Wheezing. Cardiovascular: Not Present- Abnormal Blood Pressure, Chest Pain, Claudications, Difficulty Breathing Lying Down, Difficulty Breathing On Exertion, Edema, Fainting / Blacking Out, Irregular Heart Beat, Palpitations, Paroxysmal Nocturnal Dyspnea, Rapid Heart Rate and Shortness of Breath. Gastrointestinal: Not Present- Black, Tarry Stool, Bloody Stool, Diarrhea, Hematemesis, Rectal Bleeding and Vomiting. Musculoskeletal: Not Present- Muscle Pain and Muscle Weakness. Neurological: Not Present- Dizziness. Psychiatric: Not Present- Depression.   Endocrine: Not Present- Cold Intolerance, Heat Intolerance and Thyroid Problems. Hematology: Not Present- Abnormal Bleeding, Anemia, Blood Clots and Easy Bruising.       Physical Exam   The physical exam findings are as follows:       General   Mental Status - Alert. General Appearance - Not in acute distress. Neck   Carotid Arteries - normal upstroke. No Bruits. Chest and Lung Exam   Inspection: Accessory muscles - No use of accessory muscles in breathing. Auscultation:   Breath sounds: - Normal.      Cardiovascular   Inspection: Jugular vein - Bilateral - Inspection Normal.  Palpation/Percussion:   Apical Impulse: - Normal.  Auscultation: Rhythm - Regular. Heart Sounds - S1 WNL and S2 WNL. No S3 or S4. Murmurs & Other Heart Sounds: Auscultation of the heart reveals - No Murmurs. Abdomen   Palpation/Percussion: Palpation and Percussion of the abdomen reveal - No Palpable abdominal masses. Auscultation: Auscultation of the abdomen reveals - Bowel sounds normal.      Peripheral Vascular   Upper Extremity: Inspection - Bilateral - No Cyanotic nailbeds or Digital clubbing. Lower Extremity:   Palpation: Edema - Bilateral - No edema. Neurologic   Mental Status: Affect - normal.  Motor: - Normal. Gait - Normal.      Assessment:       ICD-10-CM ICD-9-CM    1. Nonrheumatic aortic valve stenosis I35.0 424.1    2. S/P AVR Z95.2 V43.3    3. Ascending aortic aneurysm (HCC) I71.2 441. 2        Plan:     1. S/p AVR and ascending aortic aneurysm repair for bicuspid aortic valve. Stable. 2. No further dizziness, palpitation. Normal cardiac and neurological work up. Asymptomatic. Clinical monitoring. 3. Recent labs reviewed.

## 2017-10-06 ENCOUNTER — TELEPHONE (OUTPATIENT)
Dept: CARDIOLOGY CLINIC | Age: 62
End: 2017-10-06

## 2017-10-06 RX ORDER — AMOXICILLIN AND CLAVULANATE POTASSIUM 875; 125 MG/1; MG/1
1 TABLET, FILM COATED ORAL 2 TIMES DAILY
COMMUNITY

## 2017-10-06 NOTE — TELEPHONE ENCOUNTER
Per pt's spouse Dr. Cortney Palacios is requesting clearance for oral surgery. Please call [phone] to advise either way as it is scheduled for Monday. Thanks!

## 2017-10-06 NOTE — TELEPHONE ENCOUNTER
Called wife Rajesh Wilson on hippa form, advised that  does not fill out the H and P forms but I could fax clearance to her or dr. She ask that I fax clearance to her @ [phone]. Faxed clearance to Rajesh Wilson @ [phone] stating that pt is okay per  from cardiac standpoint to have oral surgery on 10/9/17. Received fax confirmation.

## 2017-10-09 ENCOUNTER — ANESTHESIA EVENT (OUTPATIENT)
Dept: SURGERY | Age: 62
End: 2017-10-09
Payer: COMMERCIAL

## 2017-10-09 ENCOUNTER — HOSPITAL ENCOUNTER (OUTPATIENT)
Age: 62
Setting detail: OUTPATIENT SURGERY
Discharge: HOME | End: 2017-10-09
Attending: DENTIST | Admitting: DENTIST
Payer: COMMERCIAL

## 2017-10-09 ENCOUNTER — ANESTHESIA (OUTPATIENT)
Dept: SURGERY | Age: 62
End: 2017-10-09
Payer: COMMERCIAL

## 2017-10-09 VITALS
OXYGEN SATURATION: 95 % | DIASTOLIC BLOOD PRESSURE: 68 MMHG | TEMPERATURE: 97.1 F | SYSTOLIC BLOOD PRESSURE: 125 MMHG | WEIGHT: 200 LBS | HEART RATE: 61 BPM | HEIGHT: 74 IN | BODY MASS INDEX: 25.67 KG/M2 | RESPIRATION RATE: 16 BRPM

## 2017-10-09 PROBLEM — J01.00 SINUSITIS, ACUTE, MAXILLARY: Status: ACTIVE | Noted: 2017-10-09

## 2017-10-09 PROCEDURE — 76010000149 HC OR TIME 1 TO 1.5 HR: Performed by: DENTIST

## 2017-10-09 PROCEDURE — 88305 TISSUE EXAM BY PATHOLOGIST: CPT | Performed by: DENTIST

## 2017-10-09 PROCEDURE — 76210000006 HC OR PH I REC 0.5 TO 1 HR: Performed by: DENTIST

## 2017-10-09 PROCEDURE — 77030011640 HC PAD GRND REM COVD -A: Performed by: DENTIST

## 2017-10-09 PROCEDURE — 77030011283 HC ELECTRD NDL COVD -A: Performed by: DENTIST

## 2017-10-09 PROCEDURE — 77030002933 HC SUT MCRYL J&J -A: Performed by: DENTIST

## 2017-10-09 PROCEDURE — 74011000250 HC RX REV CODE- 250: Performed by: DENTIST

## 2017-10-09 PROCEDURE — 77030013079 HC BLNKT BAIR HGGR 3M -A: Performed by: ANESTHESIOLOGY

## 2017-10-09 PROCEDURE — 76210000021 HC REC RM PH II 0.5 TO 1 HR: Performed by: DENTIST

## 2017-10-09 PROCEDURE — 77030032490 HC SLV COMPR SCD KNE COVD -B: Performed by: DENTIST

## 2017-10-09 PROCEDURE — 74011250636 HC RX REV CODE- 250/636: Performed by: ANESTHESIOLOGY

## 2017-10-09 PROCEDURE — 74011000250 HC RX REV CODE- 250

## 2017-10-09 PROCEDURE — 76060000033 HC ANESTHESIA 1 TO 1.5 HR: Performed by: DENTIST

## 2017-10-09 PROCEDURE — 74011250636 HC RX REV CODE- 250/636

## 2017-10-09 PROCEDURE — 77030008703 HC TU ET UNCUF COVD -A: Performed by: ANESTHESIOLOGY

## 2017-10-09 PROCEDURE — 77030004386 HC BUR FISS STRY -B: Performed by: DENTIST

## 2017-10-09 PROCEDURE — 77030002888 HC SUT CHRMC J&J -A: Performed by: DENTIST

## 2017-10-09 PROCEDURE — 77030018836 HC SOL IRR NACL ICUM -A: Performed by: DENTIST

## 2017-10-09 RX ORDER — FENTANYL CITRATE 50 UG/ML
25 INJECTION, SOLUTION INTRAMUSCULAR; INTRAVENOUS
Status: DISCONTINUED | OUTPATIENT
Start: 2017-10-09 | End: 2017-10-09 | Stop reason: HOSPADM

## 2017-10-09 RX ORDER — LIDOCAINE HYDROCHLORIDE AND EPINEPHRINE 20; 10 MG/ML; UG/ML
INJECTION, SOLUTION INFILTRATION; PERINEURAL AS NEEDED
Status: DISCONTINUED | OUTPATIENT
Start: 2017-10-09 | End: 2017-10-09 | Stop reason: HOSPADM

## 2017-10-09 RX ORDER — LIDOCAINE HYDROCHLORIDE 20 MG/ML
INJECTION, SOLUTION EPIDURAL; INFILTRATION; INTRACAUDAL; PERINEURAL AS NEEDED
Status: DISCONTINUED | OUTPATIENT
Start: 2017-10-09 | End: 2017-10-09 | Stop reason: HOSPADM

## 2017-10-09 RX ORDER — CEFAZOLIN SODIUM 1 G/3ML
INJECTION, POWDER, FOR SOLUTION INTRAMUSCULAR; INTRAVENOUS AS NEEDED
Status: DISCONTINUED | OUTPATIENT
Start: 2017-10-09 | End: 2017-10-09 | Stop reason: HOSPADM

## 2017-10-09 RX ORDER — MIDAZOLAM HYDROCHLORIDE 1 MG/ML
0.5 INJECTION, SOLUTION INTRAMUSCULAR; INTRAVENOUS
Status: DISCONTINUED | OUTPATIENT
Start: 2017-10-09 | End: 2017-10-09 | Stop reason: HOSPADM

## 2017-10-09 RX ORDER — GLYCOPYRROLATE 0.2 MG/ML
INJECTION INTRAMUSCULAR; INTRAVENOUS AS NEEDED
Status: DISCONTINUED | OUTPATIENT
Start: 2017-10-09 | End: 2017-10-09 | Stop reason: HOSPADM

## 2017-10-09 RX ORDER — SODIUM CHLORIDE 9 MG/ML
25 INJECTION, SOLUTION INTRAVENOUS CONTINUOUS
Status: DISCONTINUED | OUTPATIENT
Start: 2017-10-09 | End: 2017-10-09 | Stop reason: HOSPADM

## 2017-10-09 RX ORDER — SUCCINYLCHOLINE CHLORIDE 20 MG/ML
INJECTION INTRAMUSCULAR; INTRAVENOUS AS NEEDED
Status: DISCONTINUED | OUTPATIENT
Start: 2017-10-09 | End: 2017-10-09 | Stop reason: HOSPADM

## 2017-10-09 RX ORDER — ROPIVACAINE HYDROCHLORIDE 5 MG/ML
30 INJECTION, SOLUTION EPIDURAL; INFILTRATION; PERINEURAL AS NEEDED
Status: DISCONTINUED | OUTPATIENT
Start: 2017-10-09 | End: 2017-10-09 | Stop reason: HOSPADM

## 2017-10-09 RX ORDER — ALBUTEROL SULFATE 0.83 MG/ML
2.5 SOLUTION RESPIRATORY (INHALATION) AS NEEDED
Status: DISCONTINUED | OUTPATIENT
Start: 2017-10-09 | End: 2017-10-09 | Stop reason: HOSPADM

## 2017-10-09 RX ORDER — SODIUM CHLORIDE, SODIUM LACTATE, POTASSIUM CHLORIDE, CALCIUM CHLORIDE 600; 310; 30; 20 MG/100ML; MG/100ML; MG/100ML; MG/100ML
INJECTION, SOLUTION INTRAVENOUS
Status: DISCONTINUED | OUTPATIENT
Start: 2017-10-09 | End: 2017-10-09 | Stop reason: HOSPADM

## 2017-10-09 RX ORDER — MIDAZOLAM HYDROCHLORIDE 1 MG/ML
1 INJECTION, SOLUTION INTRAMUSCULAR; INTRAVENOUS AS NEEDED
Status: DISCONTINUED | OUTPATIENT
Start: 2017-10-09 | End: 2017-10-09 | Stop reason: HOSPADM

## 2017-10-09 RX ORDER — SODIUM CHLORIDE, SODIUM LACTATE, POTASSIUM CHLORIDE, CALCIUM CHLORIDE 600; 310; 30; 20 MG/100ML; MG/100ML; MG/100ML; MG/100ML
1000 INJECTION, SOLUTION INTRAVENOUS CONTINUOUS
Status: DISCONTINUED | OUTPATIENT
Start: 2017-10-09 | End: 2017-10-09 | Stop reason: HOSPADM

## 2017-10-09 RX ORDER — HYDROMORPHONE HYDROCHLORIDE 1 MG/ML
0.2 INJECTION, SOLUTION INTRAMUSCULAR; INTRAVENOUS; SUBCUTANEOUS
Status: DISCONTINUED | OUTPATIENT
Start: 2017-10-09 | End: 2017-10-09 | Stop reason: HOSPADM

## 2017-10-09 RX ORDER — DIPHENHYDRAMINE HYDROCHLORIDE 50 MG/ML
12.5 INJECTION, SOLUTION INTRAMUSCULAR; INTRAVENOUS AS NEEDED
Status: DISCONTINUED | OUTPATIENT
Start: 2017-10-09 | End: 2017-10-09 | Stop reason: HOSPADM

## 2017-10-09 RX ORDER — HYDROCODONE BITARTRATE AND ACETAMINOPHEN 5; 325 MG/1; MG/1
1 TABLET ORAL AS NEEDED
Status: DISCONTINUED | OUTPATIENT
Start: 2017-10-09 | End: 2017-10-09 | Stop reason: HOSPADM

## 2017-10-09 RX ORDER — MIDAZOLAM HYDROCHLORIDE 1 MG/ML
INJECTION, SOLUTION INTRAMUSCULAR; INTRAVENOUS AS NEEDED
Status: DISCONTINUED | OUTPATIENT
Start: 2017-10-09 | End: 2017-10-09 | Stop reason: HOSPADM

## 2017-10-09 RX ORDER — MORPHINE SULFATE 10 MG/ML
2 INJECTION, SOLUTION INTRAMUSCULAR; INTRAVENOUS
Status: DISCONTINUED | OUTPATIENT
Start: 2017-10-09 | End: 2017-10-09 | Stop reason: HOSPADM

## 2017-10-09 RX ORDER — LIDOCAINE HYDROCHLORIDE 10 MG/ML
0.1 INJECTION, SOLUTION EPIDURAL; INFILTRATION; INTRACAUDAL; PERINEURAL AS NEEDED
Status: DISCONTINUED | OUTPATIENT
Start: 2017-10-09 | End: 2017-10-09 | Stop reason: HOSPADM

## 2017-10-09 RX ORDER — FENTANYL CITRATE 50 UG/ML
50 INJECTION, SOLUTION INTRAMUSCULAR; INTRAVENOUS AS NEEDED
Status: DISCONTINUED | OUTPATIENT
Start: 2017-10-09 | End: 2017-10-09 | Stop reason: HOSPADM

## 2017-10-09 RX ORDER — ROCURONIUM BROMIDE 10 MG/ML
INJECTION, SOLUTION INTRAVENOUS AS NEEDED
Status: DISCONTINUED | OUTPATIENT
Start: 2017-10-09 | End: 2017-10-09 | Stop reason: HOSPADM

## 2017-10-09 RX ORDER — ONDANSETRON 2 MG/ML
4 INJECTION INTRAMUSCULAR; INTRAVENOUS AS NEEDED
Status: DISCONTINUED | OUTPATIENT
Start: 2017-10-09 | End: 2017-10-09 | Stop reason: HOSPADM

## 2017-10-09 RX ORDER — ONDANSETRON 2 MG/ML
INJECTION INTRAMUSCULAR; INTRAVENOUS AS NEEDED
Status: DISCONTINUED | OUTPATIENT
Start: 2017-10-09 | End: 2017-10-09 | Stop reason: HOSPADM

## 2017-10-09 RX ORDER — NEOSTIGMINE METHYLSULFATE 1 MG/ML
INJECTION INTRAVENOUS AS NEEDED
Status: DISCONTINUED | OUTPATIENT
Start: 2017-10-09 | End: 2017-10-09 | Stop reason: HOSPADM

## 2017-10-09 RX ORDER — FENTANYL CITRATE 50 UG/ML
INJECTION, SOLUTION INTRAMUSCULAR; INTRAVENOUS AS NEEDED
Status: DISCONTINUED | OUTPATIENT
Start: 2017-10-09 | End: 2017-10-09 | Stop reason: HOSPADM

## 2017-10-09 RX ORDER — PROPOFOL 10 MG/ML
INJECTION, EMULSION INTRAVENOUS AS NEEDED
Status: DISCONTINUED | OUTPATIENT
Start: 2017-10-09 | End: 2017-10-09 | Stop reason: HOSPADM

## 2017-10-09 RX ORDER — HYDROCODONE BITARTRATE AND ACETAMINOPHEN 5; 325 MG/1; MG/1
1 TABLET ORAL
Qty: 20 TAB | Refills: 0 | Status: SHIPPED | OUTPATIENT
Start: 2017-10-09 | End: 2020-01-07

## 2017-10-09 RX ORDER — DEXAMETHASONE SODIUM PHOSPHATE 4 MG/ML
INJECTION, SOLUTION INTRA-ARTICULAR; INTRALESIONAL; INTRAMUSCULAR; INTRAVENOUS; SOFT TISSUE AS NEEDED
Status: DISCONTINUED | OUTPATIENT
Start: 2017-10-09 | End: 2017-10-09 | Stop reason: HOSPADM

## 2017-10-09 RX ORDER — GLYCOPYRROLATE 0.2 MG/ML
0.2 INJECTION INTRAMUSCULAR; INTRAVENOUS
Status: DISCONTINUED | OUTPATIENT
Start: 2017-10-09 | End: 2017-10-09 | Stop reason: HOSPADM

## 2017-10-09 RX ADMIN — LIDOCAINE HYDROCHLORIDE 60 MG: 20 INJECTION, SOLUTION EPIDURAL; INFILTRATION; INTRACAUDAL; PERINEURAL at 07:38

## 2017-10-09 RX ADMIN — SODIUM CHLORIDE, SODIUM LACTATE, POTASSIUM CHLORIDE, CALCIUM CHLORIDE: 600; 310; 30; 20 INJECTION, SOLUTION INTRAVENOUS at 08:51

## 2017-10-09 RX ADMIN — SODIUM CHLORIDE, SODIUM LACTATE, POTASSIUM CHLORIDE, CALCIUM CHLORIDE: 600; 310; 30; 20 INJECTION, SOLUTION INTRAVENOUS at 07:29

## 2017-10-09 RX ADMIN — CEFAZOLIN SODIUM 2 G: 1 INJECTION, POWDER, FOR SOLUTION INTRAMUSCULAR; INTRAVENOUS at 07:48

## 2017-10-09 RX ADMIN — ROCURONIUM BROMIDE 45 MG: 10 INJECTION, SOLUTION INTRAVENOUS at 07:47

## 2017-10-09 RX ADMIN — ROCURONIUM BROMIDE 5 MG: 10 INJECTION, SOLUTION INTRAVENOUS at 07:38

## 2017-10-09 RX ADMIN — ONDANSETRON 4 MG: 2 INJECTION INTRAMUSCULAR; INTRAVENOUS at 08:44

## 2017-10-09 RX ADMIN — DEXAMETHASONE SODIUM PHOSPHATE 10 MG: 4 INJECTION, SOLUTION INTRA-ARTICULAR; INTRALESIONAL; INTRAMUSCULAR; INTRAVENOUS; SOFT TISSUE at 07:47

## 2017-10-09 RX ADMIN — MIDAZOLAM HYDROCHLORIDE 2 MG: 1 INJECTION, SOLUTION INTRAMUSCULAR; INTRAVENOUS at 07:29

## 2017-10-09 RX ADMIN — NEOSTIGMINE METHYLSULFATE 1 MG: 1 INJECTION INTRAVENOUS at 08:50

## 2017-10-09 RX ADMIN — ONDANSETRON 4 MG: 2 INJECTION INTRAMUSCULAR; INTRAVENOUS at 07:47

## 2017-10-09 RX ADMIN — GLYCOPYRROLATE 0.2 MG: 0.2 INJECTION INTRAMUSCULAR; INTRAVENOUS at 08:47

## 2017-10-09 RX ADMIN — NEOSTIGMINE METHYLSULFATE 1 MG: 1 INJECTION INTRAVENOUS at 08:51

## 2017-10-09 RX ADMIN — FENTANYL CITRATE 50 MCG: 50 INJECTION, SOLUTION INTRAMUSCULAR; INTRAVENOUS at 07:29

## 2017-10-09 RX ADMIN — GLYCOPYRROLATE 0.2 MG: 0.2 INJECTION INTRAMUSCULAR; INTRAVENOUS at 08:51

## 2017-10-09 RX ADMIN — SODIUM CHLORIDE, SODIUM LACTATE, POTASSIUM CHLORIDE, AND CALCIUM CHLORIDE 1000 ML: 600; 310; 30; 20 INJECTION, SOLUTION INTRAVENOUS at 07:18

## 2017-10-09 RX ADMIN — SUCCINYLCHOLINE CHLORIDE 160 MG: 20 INJECTION INTRAMUSCULAR; INTRAVENOUS at 07:38

## 2017-10-09 RX ADMIN — NEOSTIGMINE METHYLSULFATE 1 MG: 1 INJECTION INTRAVENOUS at 08:49

## 2017-10-09 RX ADMIN — PROPOFOL 150 MG: 10 INJECTION, EMULSION INTRAVENOUS at 07:38

## 2017-10-09 RX ADMIN — FENTANYL CITRATE 50 MCG: 50 INJECTION, SOLUTION INTRAMUSCULAR; INTRAVENOUS at 07:37

## 2017-10-09 RX ADMIN — GLYCOPYRROLATE 0.2 MG: 0.2 INJECTION INTRAMUSCULAR; INTRAVENOUS at 08:49

## 2017-10-09 NOTE — BRIEF OP NOTE
BRIEF OPERATIVE NOTE    Date of Procedure: 10/9/2017   Preoperative Diagnosis: CHRONIC and SUB-ACUTE LEFT  MAXILLARY SINUSITIS; ORAL-ANTRAL FISTULA  Postoperative Diagnosis: SAME  Procedure(s):  LEFT GORDON GENOVEVA MAXILLARY SINUSOTOMY FOR DEBRIDEMENT;  OROANTRAL FISTULA CLOSURE  Surgeon(s) and Role:     * Roslyn Duenas DDS - Primary         Assistant Staff:       Surgical Staff:  Circ-1: Eddie Napoles RN  Scrub RN-1: Rowan Maddox RN  Event Time In   Incision Start 6177   Incision Close 0840     Anesthesia: General   Estimated Blood Loss: 20cc  Specimens:   ID Type Source Tests Collected by Time Destination   1 : MATERIAL FROM LEFT MAXILLARY SINUS Fresh Maxillary Sinus  Roslyn Duenas DDS 10/9/2017 5140 Pathology      Findings: Copious debris and infected mucous retention cyst in left maxillary sinus.    Complications: None  Implants: * No implants in log *

## 2017-10-09 NOTE — IP AVS SNAPSHOT
[address] 
[phone] Patient: Akanksha Negro MRN: [id number] BPP:[birth year] You are allergic to the following No active allergies Recent Documentation Height Weight BMI Smoking Status 1.88 m 90.7 kg 25.68 kg/m2 Former Smoker Emergency Contacts Name Discharge Info Relation Home Work Mobile [phone] CAREGIVER [3] Spouse [3] [phone] About your hospitalization You were admitted on:  October 9, 2017 [facility] You were discharged on:  October 9, 2017 Unit phone number:  [phone] Why you were hospitalized Your primary diagnosis was:  Sinusitis, Acute, Maxillary Providers Seen During Your Hospitalizations Provider Role Specialty Primary office phone Magui Simmons DDS Attending Provider Oral Surgery [phone] Your Primary Care Physician (PCP) Primary Care Physician Office Phone Office Fax Maricarmen Esparza [phone] Follow-up Information Follow up With Details Comments Contact Info [facility], [address] [place] [address] 
[phone] Magui Simmons DDS Schedule an appointment as soon as possible for a visit in 1 week(s)  [address] [facility] [address] 
[phone] Current Discharge Medication List  
  
START taking these medications Dose & Instructions Dispensing Information Comments Morning Noon Evening Bedtime HYDROcodone-acetaminophen 5-325 mg per tablet Commonly known as:  Birda Barron Your last dose was: Your next dose is:    
   
   
 Dose:  1 Tab Take 1 Tab by mouth every four (4) hours as needed for Pain. Max Daily Amount: 6 Tabs. Quantity:  20 Tab Refills:  0 CONTINUE these medications which have NOT CHANGED Dose & Instructions Dispensing Information Comments Morning Noon Evening Bedtime ADVIL 200 mg tablet Generic drug:  ibuprofen Your last dose was: Your next dose is:    
   
   
 Dose:  200 mg Take 200 mg by mouth every six (6) hours as needed. 400mg 1-2 times a day PRN Refills:  0  
     
   
   
   
  
 aspirin 81 mg chewable tablet Your last dose was: Your next dose is:    
   
   
 Dose:  81 mg Take 1 Tab by mouth daily for 360 days. Quantity:  30 Tab Refills:  11  
     
   
   
   
  
 AUGMENTIN 875-125 mg per tablet Generic drug:  amoxicillin-clavulanate Your last dose was: Your next dose is:    
   
   
 Dose:  1 Tab Take 1 Tab by mouth two (2) times a day. Refills:  0  
     
   
   
   
  
 FISH OIL 1,000 mg Cap Generic drug:  omega-3 fatty acids-vitamin e Your last dose was: Your next dose is:    
   
   
 Dose:  1 Cap Take 1 Cap by mouth daily. Refills:  0  
     
   
   
   
  
 glucosamine-chondroitin 500-400 mg Cap Commonly known as:  [address] [place] [address] Your last dose was: Your next dose is:    
   
   
 Dose:  2 Cap Take 2 Caps by mouth daily. Refills:  0  
     
   
   
   
  
 melatonin Tab tablet Your last dose was: Your next dose is:    
   
   
 Dose:  5 mg Take 1 Tab by mouth nightly. Quantity:  30 Tab Refills:  0  
     
   
   
   
  
 OTHER(NON-FORMULARY) Your last dose was: Your next dose is:    
   
   
 Vestibular Physical Therapy Please Eval and Treat  Dx: Vertigo Quantity:  1 Each Refills:  0 PROBIOTIC 4X 10-15 mg Tbec Generic drug:  B.infantis-B.ani-B.long-B.bifi Your last dose was: Your next dose is:    
   
   
 Dose:  1 Tab Take 1 Tab by mouth daily. Refills:  0  
     
   
   
   
  
 TESTIM 50 mg/5 gram (1 %) gel Generic drug:  testosterone Your last dose was: Your next dose is:    
   
   
 Dose:  5 g  
5 g by TransDERmal route daily. Indications: androgel brand name Refills:  0 Where to Get Your Medications Information on where to get these meds will be given to you by the nurse or doctor. ! Ask your nurse or doctor about these medications HYDROcodone-acetaminophen 5-325 mg per tablet Discharge Instructions PATIENT DISCHARGE INSTRUCTIONS PATIENT DISCHARGE INSTRUCTIONS Kvng Abreu / [id number] : 1955 Admitted 10/9/2017 Discharged: 10/9/2017 · It is important that you take the medication exactly as they are prescribed. · Keep your medication in the bottles provided by the pharmacist and keep a list of the medication names, dosages, and times to be taken in your wallet. · Do not take other medications without consulting your doctor. What to do at [facility] Recommended Diet: Regular Diet - Soft at first and chew on the right. Recommended Activity: Activity as tolerated - mild Ice to left face - 20 min on/off for the first 2 days as tolerated. Keep head elevated while lying down for 5 days after surgery. May use Afrin nasal spray as directed on package for up to 4 days post op for nasal stuffiness. Get over-the-counter Claritin-D 12 Hour and take for 5 days as directed on package. If you experience any of the following symptoms  - unusual bleeding, nausea or pain, please follow up with Dr. Reilly Pickens at [phone]. Gustavo Shah Signed By: Mina Lamb DDS 2017   
 
  
______________________________________________________________________ Anesthesia Discharge Instructions After general anesthesia or intervenous sedation, for 24 hours or while taking prescription Narcotics: · Limit your activities · Do not drive or operate hazardous machinery · If you have not urinated within 8 hours after discharge, please contact your surgeon on call. · Do not make important personal or business decisions · Do not drink alcoholic beverages Report the following to your surgeon: 
· Excessive pain, swelling, redness or odor of or around the surgical area · Temperature over 100.5 degrees · Nausea and vomiting lasting longer than 4 hours or if unable to take medication · Any signs of decreased circulation or nerve impairment to extremity:  Change in color, persistent numbness, tingling, coldness or increased pain. · Any questions Discharge Instructions Attachments/References MEFS - HYDROCODONE/ACETAMINOPHEN (VICODIN, Colt Herrlich, LORTAB) - (BY MOUTH) (ENGLISH) Discharge Orders None Introducing [facility]! Dear Mayda Vargas: Thank you for requesting a [employer] account. Our records indicate that you already have an active [employer] account. You can access your account anytime at [email or web address] Did you know that you can access your hospital and ER discharge instructions at any time in [employer]? You can also review all of your test results from your hospital stay or ER visit. Additional Information If you have questions, please visit the Frequently Asked Questions section of the [employer] website at [email or web address] Remember, [employer] is NOT to be used for urgent needs. For medical emergencies, dial 911. Now available from your iPhone and Android! General Information Please provide this summary of care documentation to your next provider. Patient Signature:  ____________________________________________________________ Date:  ____________________________________________________________  
  
Lonny Wright Provider Signature:  ____________________________________________________________ Date:  ____________________________________________________________ More Information Hydrocodone/Acetaminophen (Vicodin, Norco, Lortab) - (By mouth) Why this medicine is used:  
Treats pain. Contact a nurse or doctor right away if you have: · Blistering, peeling, red skin rash · Fast or slow heartbeat, shallow breathing, blue lips, fingernails, or skin · Anxiety, restlessness, muscle spasms, twitching, seeing or hearing things that are not there · Dark urine or pale stools, yellow skin or eyes · Extreme weakness, sweating, seizures, cold or clammy skin · Lightheadedness, dizziness, fainting, fever, sweating Common side effects: 
· Constipation, nausea, vomiting, loss of appetite, stomach pain · Tiredness or sleepiness © 2017 [facility] Information is for End User's use only and may not be sold, redistributed or otherwise used for commercial purposes.

## 2017-10-09 NOTE — DISCHARGE INSTRUCTIONS
PATIENT DISCHARGE INSTRUCTIONS      PATIENT DISCHARGE INSTRUCTIONS    Eduardo [facility] / [id number] : 1955    Admitted 10/9/2017 Discharged: 10/9/2017       · It is important that you take the medication exactly as they are prescribed. · Keep your medication in the bottles provided by the pharmacist and keep a list of the medication names, dosages, and times to be taken in your wallet. · Do not take other medications without consulting your doctor. What to do at Home    Recommended Diet: Regular Diet - Soft at first and chew on the right. Recommended Activity: Activity as tolerated - mild    Ice to left face - 20 min on/off for the first 2 days as tolerated. Keep head elevated while lying down for 5 days after surgery. May use Afrin nasal spray as directed on package for up to 4 days post op for nasal stuffiness. Get over-the-counter Claritin-D 12 Hour and take for 5 days as directed on package. If you experience any of the following symptoms  - unusual bleeding, nausea or pain, please follow up with Dr. Hailee Middleton at [phone]. .        Signed By: Rd Tuttle DDS     2017         ______________________________________________________________________    Anesthesia Discharge Instructions    After general anesthesia or intervenous sedation, for 24 hours or while taking prescription Narcotics:  · Limit your activities  · Do not drive or operate hazardous machinery  · If you have not urinated within 8 hours after discharge, please contact your surgeon on call.   · Do not make important personal or business decisions  · Do not drink alcoholic beverages    Report the following to your surgeon:  · Excessive pain, swelling, redness or odor of or around the surgical area  · Temperature over 100.5 degrees  · Nausea and vomiting lasting longer than 4 hours or if unable to take medication  · Any signs of decreased circulation or nerve impairment to extremity:  Change in color, persistent numbness, tingling, coldness or increased pain.   · Any questions

## 2017-10-09 NOTE — ANESTHESIA PREPROCEDURE EVALUATION
Anesthetic History   No history of anesthetic complications            Review of Systems / Medical History  Patient summary reviewed, nursing notes reviewed and pertinent labs reviewed    Pulmonary  Within defined limits                 Neuro/Psych   Within defined limits           Cardiovascular      Valvular problems/murmurs: aortic stenosis            Exercise tolerance: >4 METS  Comments: S/p Aortic root and AVR secondary to congenital bicuspid valve   GI/Hepatic/Renal  Within defined limits              Endo/Other  Within defined limits           Other Findings              Physical Exam    Airway  Mallampati: II  TM Distance: > 6 cm  Neck ROM: normal range of motion   Mouth opening: Normal     Cardiovascular  Regular rate and rhythm,  S1 and S2 normal,  no murmur, click, rub, or gallop             Dental  No notable dental hx       Pulmonary  Breath sounds clear to auscultation               Abdominal  GI exam deferred       Other Findings            Anesthetic Plan    ASA: 2  Anesthesia type: general          Induction: Intravenous  Anesthetic plan and risks discussed with: Patient

## 2017-10-09 NOTE — OP NOTES
[address], [address] [place] [address] NOTE       Name:  Yelena Courtney   MR#:  [id number]   :  1955   Account #:  [de-identified]    Surgery Date:  10/09/2017   Date of Adm:  10/09/2017       PREOPERATIVE DIAGNOSIS: Chronic and subacute left maxillary   sinusitis and oral antral fistula. 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 PROCEDURES PERFORMED: Left Cote-Frank maxillary   sinusotomy for debridement, and oral antral fistula closure. SURGEON: Emeka Patino DDS    ASSISTANT SURGEON: None. ANESTHESIA: General nasoendotracheal.    ESTIMATED BLOOD LOSS: 20 mL. SPECIMENS REMOVED: Contents of the left maxillary sinus x1. COMPLICATIONS: None. INDICATIONS FOR SURGERY: The patient is a 78-year-old white   male, who over the last 10 months, has had left-sided maxillary pain   and discomfort that originated from an infected left maxillary tooth #15. Multiple attempts at treating the offending tooth had failed, and the   tooth was extracted approximately 8 weeks ago. Since that time, he   has had persistent pain, vertigo and drainage from the extraction site,   and a cone beam CT scan was obtained showing multiple areas of   bony dehiscence into the sinus, along with significant proliferation of   soft tissue into the left maxillary sinus as well. Due to the nature of the   disease and the fact that the patient had previous maxillary osteotomy   surgery, he is being taken to [facility] for outpatient surgical   debridement and closure of the oral antral fistula. DESCRIPTION OF PROCEDURE: The patient was taken to the   operating room, placed in the supine position. After induction of   anesthesia and nasoendotracheal intubation, he was prepped and   draped in routine fashion for intraoral surgery.  A throat pack was   placed, and a total of 8 mL of 2% Xylocaine with 1:100,000 epinephrine was given to anesthetize the left maxilla. An incision was made laterally along the left maxillary tuberosity, and   down across the maxillary ridge on the palatal aspect of the fistula, in   the area where tooth #15 was extracted. The incision continued to the   distal aspect of tooth #14, and then onto the facial aspect of this tooth   and up into the vestibule. The needle-tip Bovie was then used to carry   the incision forward through the old incision site at the mucogingival   junction. This was carried forward to the anterior portion of the canine   tooth. A full-thickness subperiosteal flap was developed superiorly, and   the fistulous tract was excised. Once the flap was reflected, the   opening into the maxillary sinus was quite evident, and purulent debris   was seen throughout this area. The bony opening was enlarged in a   Cote-Frank fashion, in order to gain access to the soft tissue contents   of the sinus. Once this was accomplished, copious soft tissue that was   infected and inflamed was removed from the maxillary sinus with a   combination of curettes, and sent for pathologic evaluation. Examination of the area where the previous fixation plate was at the   malar buttress, showed that this plate and screws had been well   incorporated into the bone, and there was no evidence of infection   directly involving this fixation plate. The decision was made to leave   this in place, and once all of the debris was removed from the sinus,   the sinus was irrigated thoroughly with normal saline. The edges of the   lateral flap were then freshened, and the periosteum was incised in   order to advance this flap and close the oral antral communication. A   4-0 Monocryl suture was then used to suture the flap in the area   posterior to tooth #14, where the fistula was initially, and a watertight   closure was obtained.  A 4-0 Monocryl was used to suture the gingiva   on the mesial aspect of tooth #14 as well, and then the remainder of   the flap was closed with running 3-0 chromic suture. The throat pack   was removed and the oropharynx was suctioned. The patient was   taken to recovery room in stable condition.         Sowmya Silva DDS MEM / Cosme Sauer   D:  10/09/2017   09:15   T:  10/09/2017   09:41   Job #:  [id number]

## 2017-10-09 NOTE — ROUTINE PROCESS
Patient: Haeven Leon MRN: [id number]  SSN: [id number]   YOB: 1955  Age: 60 y.o. Sex: male     Patient is status post Procedure(s):  GORDON GENOVEVA MAXILLARY SINUSOTOMY, OROANTRAL FISTULA CLOSURE. Surgeon(s) and Role:     * Cheyenne Roberto DDS - Primary    Local/Dose/Irrigation:  8 ML 2% LIDOCAINE WITH EPINEPHRINE WAS INJECTED INTO LEFT UPPER MOUTH    Saline irrigation to sterile field.                     Peripheral IV 10/09/17 Left Hand (Active)   Dressing Status New 10/9/2017  7:17 AM   Dressing Type Transparent 10/9/2017  7:17 AM   Hub Color/Line Status Infusing 10/9/2017  7:17 AM   Alcohol Cap Used Yes 10/9/2017  7:17 AM            Airway - Endotracheal Tube 10/09/17 Nare, right (Active)                   Dressing/Packing:  Wound Mouth Left;Upper-DRESSING TYPE: Open to air (10/09/17 0840)  Splint/Cast:  ]    Other:  NONE

## 2017-10-09 NOTE — PERIOP NOTES
Spoke with Dr. Reilly Pickens about patient complaining of double vision. Dr. Reilly Pickens said it is probably due to the lidocaine used and will get better as the lidocaine wears off. Pt informed and knows to call Dr. Reilly Pickens if not better.

## 2019-05-15 NOTE — ANESTHESIA POSTPROCEDURE EVALUATION
Post-Anesthesia Evaluation and Assessment    Patient: Carin Form MRN: [id number]  SSN: [id number]    YOB: 1955  Age: 60 y.o. Sex: male       Cardiovascular Function/Vital Signs  Visit Vitals    /68    Pulse 61    Temp 36.2 °C (97.1 °F)    Resp 16    Ht 6' 2\" (1.88 m)    Wt 90.7 kg (200 lb)    SpO2 95%    BMI 25.68 kg/m2       Patient is status post general anesthesia for Procedure(s):  GORDON GENOVEVA MAXILLARY SINUSOTOMY, OROANTRAL FISTULA CLOSURE. Nausea/Vomiting: None    Postoperative hydration reviewed and adequate. Pain:  Pain Scale 1: Numeric (0 - 10) (10/09/17 0951)  Pain Intensity 1: 1 (10/09/17 0951)   Managed    Neurological Status:   Neuro (WDL): Within Defined Limits (10/09/17 0951)   At baseline    Mental Status and Level of Consciousness: Arousable    Pulmonary Status:   O2 Device: Room air (10/09/17 0951)   Adequate oxygenation and airway patent    Complications related to anesthesia: None    Post-anesthesia assessment completed.  No concerns    Signed By: Omar Gamble MD     October 9, 2017
no distress

## 2020-01-07 ENCOUNTER — HOSPITAL ENCOUNTER (OUTPATIENT)
Dept: GENERAL RADIOLOGY | Age: 65
Discharge: HOME | End: 2020-01-07
Attending: COLON & RECTAL SURGERY
Payer: MEDICARE

## 2020-01-07 ENCOUNTER — HOSPITAL ENCOUNTER (OUTPATIENT)
Dept: PREADMISSION TESTING | Age: 65
Discharge: HOME | End: 2020-01-07
Payer: MEDICARE

## 2020-01-07 VITALS
TEMPERATURE: 98.2 F | SYSTOLIC BLOOD PRESSURE: 123 MMHG | DIASTOLIC BLOOD PRESSURE: 72 MMHG | HEIGHT: 74 IN | BODY MASS INDEX: 25.43 KG/M2 | RESPIRATION RATE: 14 BRPM | HEART RATE: 53 BPM | WEIGHT: 198.13 LBS

## 2020-01-07 LAB
ANION GAP SERPL CALC-SCNC: 4 MMOL/L (ref 5–15)
BUN SERPL-MCNC: 14 MG/DL (ref 6–20)
BUN/CREAT SERPL: 16 (ref 12–20)
CALCIUM SERPL-MCNC: 8.5 MG/DL (ref 8.5–10.1)
CHLORIDE SERPL-SCNC: 109 MMOL/L (ref 97–108)
CO2 SERPL-SCNC: 26 MMOL/L (ref 21–32)
CREAT SERPL-MCNC: 0.89 MG/DL (ref 0.7–1.3)
ERYTHROCYTE [DISTWIDTH] IN BLOOD BY AUTOMATED COUNT: 13.9 % (ref 11.5–14.5)
GLUCOSE SERPL-MCNC: 94 MG/DL (ref 65–100)
HCT VFR BLD AUTO: 46.9 % (ref 36.6–50.3)
HGB BLD-MCNC: 15.3 G/DL (ref 12.1–17)
MCH RBC QN AUTO: 29.7 PG (ref 26–34)
MCHC RBC AUTO-ENTMCNC: 32.6 G/DL (ref 30–36.5)
MCV RBC AUTO: 90.9 FL (ref 80–99)
NRBC # BLD: 0 K/UL (ref 0–0.01)
NRBC BLD-RTO: 0 PER 100 WBC
PLATELET # BLD AUTO: 150 K/UL (ref 150–400)
PMV BLD AUTO: 10.1 FL (ref 8.9–12.9)
POTASSIUM SERPL-SCNC: 4.4 MMOL/L (ref 3.5–5.1)
RBC # BLD AUTO: 5.16 M/UL (ref 4.1–5.7)
SODIUM SERPL-SCNC: 139 MMOL/L (ref 136–145)
WBC # BLD AUTO: 6.3 K/UL (ref 4.1–11.1)

## 2020-01-07 PROCEDURE — 80048 BASIC METABOLIC PNL TOTAL CA: CPT

## 2020-01-07 PROCEDURE — 85027 COMPLETE CBC AUTOMATED: CPT

## 2020-01-07 PROCEDURE — 93005 ELECTROCARDIOGRAM TRACING: CPT

## 2020-01-07 PROCEDURE — 71046 X-RAY EXAM CHEST 2 VIEWS: CPT

## 2020-01-07 RX ORDER — ASPIRIN 81 MG/1
81 TABLET ORAL DAILY
COMMUNITY
End: 2020-01-14

## 2020-01-07 RX ORDER — BISMUTH SUBSALICYLATE 262 MG
1 TABLET,CHEWABLE ORAL DAILY
COMMUNITY

## 2020-01-07 RX ORDER — TESTOSTERONE CYPIONATE 200 MG/ML
100 INJECTION INTRAMUSCULAR
COMMUNITY

## 2020-01-07 RX ORDER — ESZOPICLONE 3 MG/1
3 TABLET, FILM COATED ORAL
COMMUNITY

## 2020-01-07 NOTE — PERIOP NOTES
Preoperative instructions reviewed with patient. Patient given SSI infection FAQS sheet. Given two bottles of skin prep chlorhexidine soap; given written and verbal instructions on use. Patient was given the opportunity to ask questions on the information provided. Surgical consent obtained and signed by patient. Patient instructed to obtain chest X-ray at [address] [place] [address] upon leaving PAT department.

## 2020-01-08 LAB
ATRIAL RATE: 52 BPM
CALCULATED P AXIS, ECG09: 64 DEGREES
CALCULATED R AXIS, ECG10: 46 DEGREES
CALCULATED T AXIS, ECG11: 51 DEGREES
DIAGNOSIS, 93000: NORMAL
P-R INTERVAL, ECG05: 198 MS
Q-T INTERVAL, ECG07: 420 MS
QRS DURATION, ECG06: 96 MS
QTC CALCULATION (BEZET), ECG08: 390 MS
VENTRICULAR RATE, ECG03: 52 BPM

## 2020-01-13 ENCOUNTER — ANESTHESIA EVENT (OUTPATIENT)
Dept: SURGERY | Age: 65
End: 2020-01-13
Payer: MEDICARE

## 2020-01-14 ENCOUNTER — HOSPITAL ENCOUNTER (OUTPATIENT)
Age: 65
Setting detail: OUTPATIENT SURGERY
Discharge: HOME | End: 2020-01-14
Attending: COLON & RECTAL SURGERY | Admitting: COLON & RECTAL SURGERY
Payer: MEDICARE

## 2020-01-14 ENCOUNTER — ANESTHESIA (OUTPATIENT)
Dept: SURGERY | Age: 65
End: 2020-01-14
Payer: MEDICARE

## 2020-01-14 VITALS
HEIGHT: 74 IN | HEART RATE: 62 BPM | DIASTOLIC BLOOD PRESSURE: 72 MMHG | RESPIRATION RATE: 12 BRPM | BODY MASS INDEX: 25.41 KG/M2 | WEIGHT: 198 LBS | SYSTOLIC BLOOD PRESSURE: 117 MMHG | OXYGEN SATURATION: 98 % | TEMPERATURE: 98 F

## 2020-01-14 DIAGNOSIS — G89.18 ACUTE POST-OPERATIVE PAIN: Primary | ICD-10-CM

## 2020-01-14 PROCEDURE — 77030031139 HC SUT VCRL2 J&J -A: Performed by: COLON & RECTAL SURGERY

## 2020-01-14 PROCEDURE — 77030011640 HC PAD GRND REM COVD -A: Performed by: COLON & RECTAL SURGERY

## 2020-01-14 PROCEDURE — 74011250637 HC RX REV CODE- 250/637

## 2020-01-14 PROCEDURE — 74011000250 HC RX REV CODE- 250: Performed by: NURSE ANESTHETIST, CERTIFIED REGISTERED

## 2020-01-14 PROCEDURE — 74011250636 HC RX REV CODE- 250/636: Performed by: COLON & RECTAL SURGERY

## 2020-01-14 PROCEDURE — 74011000258 HC RX REV CODE- 258: Performed by: COLON & RECTAL SURGERY

## 2020-01-14 PROCEDURE — 51798 US URINE CAPACITY MEASURE: CPT

## 2020-01-14 PROCEDURE — 77030040361 HC SLV COMPR DVT MDII -B: Performed by: COLON & RECTAL SURGERY

## 2020-01-14 PROCEDURE — 76210000021 HC REC RM PH II 0.5 TO 1 HR: Performed by: COLON & RECTAL SURGERY

## 2020-01-14 PROCEDURE — 74011000272 HC RX REV CODE- 272: Performed by: COLON & RECTAL SURGERY

## 2020-01-14 PROCEDURE — 74011250637 HC RX REV CODE- 250/637: Performed by: COLON & RECTAL SURGERY

## 2020-01-14 PROCEDURE — 77030008684 HC TU ET CUF COVD -B: Performed by: NURSE ANESTHETIST, CERTIFIED REGISTERED

## 2020-01-14 PROCEDURE — 74011250636 HC RX REV CODE- 250/636: Performed by: NURSE ANESTHETIST, CERTIFIED REGISTERED

## 2020-01-14 PROCEDURE — 76060000034 HC ANESTHESIA 1.5 TO 2 HR: Performed by: COLON & RECTAL SURGERY

## 2020-01-14 PROCEDURE — 77030026438 HC STYL ET INTUB CARD -A: Performed by: NURSE ANESTHETIST, CERTIFIED REGISTERED

## 2020-01-14 PROCEDURE — 77030034626 HC LIGASURE SM JAW SEAL OPN SURG COVD -E: Performed by: COLON & RECTAL SURGERY

## 2020-01-14 PROCEDURE — 74011000250 HC RX REV CODE- 250: Performed by: COLON & RECTAL SURGERY

## 2020-01-14 PROCEDURE — 88304 TISSUE EXAM BY PATHOLOGIST: CPT

## 2020-01-14 PROCEDURE — 76210000006 HC OR PH I REC 0.5 TO 1 HR: Performed by: COLON & RECTAL SURGERY

## 2020-01-14 PROCEDURE — 77030018836 HC SOL IRR NACL ICUM -A: Performed by: COLON & RECTAL SURGERY

## 2020-01-14 PROCEDURE — 76010000153 HC OR TIME 1.5 TO 2 HR: Performed by: COLON & RECTAL SURGERY

## 2020-01-14 PROCEDURE — 74011250636 HC RX REV CODE- 250/636

## 2020-01-14 PROCEDURE — 77030040922 HC BLNKT HYPOTHRM STRY -A

## 2020-01-14 RX ORDER — FENTANYL CITRATE 50 UG/ML
INJECTION, SOLUTION INTRAMUSCULAR; INTRAVENOUS AS NEEDED
Status: DISCONTINUED | OUTPATIENT
Start: 2020-01-14 | End: 2020-01-14 | Stop reason: HOSPADM

## 2020-01-14 RX ORDER — HYDROMORPHONE HYDROCHLORIDE 2 MG/1
TABLET ORAL
Status: COMPLETED
Start: 2020-01-14 | End: 2020-01-14

## 2020-01-14 RX ORDER — HYDROMORPHONE HYDROCHLORIDE 2 MG/1
2-4 TABLET ORAL
Qty: 40 TAB | Refills: 0 | Status: SHIPPED | OUTPATIENT
Start: 2020-01-14 | End: 2020-01-21

## 2020-01-14 RX ORDER — LIDOCAINE HYDROCHLORIDE 20 MG/ML
INJECTION, SOLUTION EPIDURAL; INFILTRATION; INTRACAUDAL; PERINEURAL AS NEEDED
Status: DISCONTINUED | OUTPATIENT
Start: 2020-01-14 | End: 2020-01-14 | Stop reason: HOSPADM

## 2020-01-14 RX ORDER — FENTANYL CITRATE 50 UG/ML
25 INJECTION, SOLUTION INTRAMUSCULAR; INTRAVENOUS
Status: DISCONTINUED | OUTPATIENT
Start: 2020-01-14 | End: 2020-01-14 | Stop reason: HOSPADM

## 2020-01-14 RX ORDER — FENTANYL CITRATE 50 UG/ML
INJECTION, SOLUTION INTRAMUSCULAR; INTRAVENOUS
Status: DISCONTINUED
Start: 2020-01-14 | End: 2020-01-14 | Stop reason: HOSPADM

## 2020-01-14 RX ORDER — SODIUM CHLORIDE 0.9 % (FLUSH) 0.9 %
5-40 SYRINGE (ML) INJECTION AS NEEDED
Status: DISCONTINUED | OUTPATIENT
Start: 2020-01-14 | End: 2020-01-14 | Stop reason: HOSPADM

## 2020-01-14 RX ORDER — HYDROMORPHONE HYDROCHLORIDE 2 MG/1
2 TABLET ORAL ONCE
Status: COMPLETED | OUTPATIENT
Start: 2020-01-14 | End: 2020-01-14

## 2020-01-14 RX ORDER — ONDANSETRON 2 MG/ML
INJECTION INTRAMUSCULAR; INTRAVENOUS
Status: COMPLETED
Start: 2020-01-14 | End: 2020-01-14

## 2020-01-14 RX ORDER — ONDANSETRON 2 MG/ML
4 INJECTION INTRAMUSCULAR; INTRAVENOUS AS NEEDED
Status: DISCONTINUED | OUTPATIENT
Start: 2020-01-14 | End: 2020-01-14 | Stop reason: HOSPADM

## 2020-01-14 RX ORDER — SODIUM CHLORIDE, SODIUM LACTATE, POTASSIUM CHLORIDE, CALCIUM CHLORIDE 600; 310; 30; 20 MG/100ML; MG/100ML; MG/100ML; MG/100ML
INJECTION, SOLUTION INTRAVENOUS
Status: DISCONTINUED | OUTPATIENT
Start: 2020-01-14 | End: 2020-01-14 | Stop reason: HOSPADM

## 2020-01-14 RX ORDER — SODIUM CHLORIDE 0.9 % (FLUSH) 0.9 %
5-40 SYRINGE (ML) INJECTION EVERY 8 HOURS
Status: DISCONTINUED | OUTPATIENT
Start: 2020-01-14 | End: 2020-01-14 | Stop reason: HOSPADM

## 2020-01-14 RX ORDER — ROCURONIUM BROMIDE 10 MG/ML
INJECTION, SOLUTION INTRAVENOUS AS NEEDED
Status: DISCONTINUED | OUTPATIENT
Start: 2020-01-14 | End: 2020-01-14 | Stop reason: HOSPADM

## 2020-01-14 RX ORDER — DEXAMETHASONE SODIUM PHOSPHATE 4 MG/ML
INJECTION, SOLUTION INTRA-ARTICULAR; INTRALESIONAL; INTRAMUSCULAR; INTRAVENOUS; SOFT TISSUE AS NEEDED
Status: DISCONTINUED | OUTPATIENT
Start: 2020-01-14 | End: 2020-01-14 | Stop reason: HOSPADM

## 2020-01-14 RX ORDER — HYDROMORPHONE HYDROCHLORIDE 1 MG/ML
0.2 INJECTION, SOLUTION INTRAMUSCULAR; INTRAVENOUS; SUBCUTANEOUS
Status: DISCONTINUED | OUTPATIENT
Start: 2020-01-14 | End: 2020-01-14 | Stop reason: HOSPADM

## 2020-01-14 RX ORDER — SODIUM CHLORIDE, SODIUM LACTATE, POTASSIUM CHLORIDE, CALCIUM CHLORIDE 600; 310; 30; 20 MG/100ML; MG/100ML; MG/100ML; MG/100ML
50 INJECTION, SOLUTION INTRAVENOUS CONTINUOUS
Status: DISCONTINUED | OUTPATIENT
Start: 2020-01-14 | End: 2020-01-14 | Stop reason: HOSPADM

## 2020-01-14 RX ORDER — MIDAZOLAM HYDROCHLORIDE 1 MG/ML
INJECTION, SOLUTION INTRAMUSCULAR; INTRAVENOUS AS NEEDED
Status: DISCONTINUED | OUTPATIENT
Start: 2020-01-14 | End: 2020-01-14 | Stop reason: HOSPADM

## 2020-01-14 RX ORDER — ONDANSETRON 2 MG/ML
INJECTION INTRAMUSCULAR; INTRAVENOUS AS NEEDED
Status: DISCONTINUED | OUTPATIENT
Start: 2020-01-14 | End: 2020-01-14 | Stop reason: HOSPADM

## 2020-01-14 RX ORDER — SUCCINYLCHOLINE CHLORIDE 20 MG/ML
INJECTION INTRAMUSCULAR; INTRAVENOUS AS NEEDED
Status: DISCONTINUED | OUTPATIENT
Start: 2020-01-14 | End: 2020-01-14 | Stop reason: HOSPADM

## 2020-01-14 RX ORDER — PROPOFOL 10 MG/ML
INJECTION, EMULSION INTRAVENOUS AS NEEDED
Status: DISCONTINUED | OUTPATIENT
Start: 2020-01-14 | End: 2020-01-14 | Stop reason: HOSPADM

## 2020-01-14 RX ORDER — BUPIVACAINE HYDROCHLORIDE AND EPINEPHRINE 2.5; 5 MG/ML; UG/ML
30 INJECTION, SOLUTION EPIDURAL; INFILTRATION; INTRACAUDAL; PERINEURAL ONCE
Status: COMPLETED | OUTPATIENT
Start: 2020-01-14 | End: 2020-01-14

## 2020-01-14 RX ORDER — LIDOCAINE HYDROCHLORIDE 10 MG/ML
0.1 INJECTION, SOLUTION EPIDURAL; INFILTRATION; INTRACAUDAL; PERINEURAL AS NEEDED
Status: DISCONTINUED | OUTPATIENT
Start: 2020-01-14 | End: 2020-01-14 | Stop reason: HOSPADM

## 2020-01-14 RX ORDER — NEOSTIGMINE METHYLSULFATE 1 MG/ML
INJECTION INTRAVENOUS AS NEEDED
Status: DISCONTINUED | OUTPATIENT
Start: 2020-01-14 | End: 2020-01-14 | Stop reason: HOSPADM

## 2020-01-14 RX ORDER — GLYCOPYRROLATE 0.2 MG/ML
INJECTION INTRAMUSCULAR; INTRAVENOUS AS NEEDED
Status: DISCONTINUED | OUTPATIENT
Start: 2020-01-14 | End: 2020-01-14 | Stop reason: HOSPADM

## 2020-01-14 RX ADMIN — ONDANSETRON 4 MG: 2 INJECTION INTRAMUSCULAR; INTRAVENOUS at 15:35

## 2020-01-14 RX ADMIN — PIPERACILLIN AND TAZOBACTAM 3.38 G: 3; .375 INJECTION, POWDER, FOR SOLUTION INTRAVENOUS at 13:42

## 2020-01-14 RX ADMIN — FENTANYL CITRATE 50 MCG: 50 INJECTION, SOLUTION INTRAMUSCULAR; INTRAVENOUS at 13:33

## 2020-01-14 RX ADMIN — PROPOFOL 200 MG: 10 INJECTION, EMULSION INTRAVENOUS at 13:33

## 2020-01-14 RX ADMIN — GLYCOPYRROLATE 0.5 MG: 0.2 INJECTION, SOLUTION INTRAMUSCULAR; INTRAVENOUS at 15:02

## 2020-01-14 RX ADMIN — ROCURONIUM BROMIDE 10 MG: 10 SOLUTION INTRAVENOUS at 13:33

## 2020-01-14 RX ADMIN — ONDANSETRON HYDROCHLORIDE 4 MG: 2 INJECTION, SOLUTION INTRAMUSCULAR; INTRAVENOUS at 13:33

## 2020-01-14 RX ADMIN — LIDOCAINE HYDROCHLORIDE 100 MG: 20 INJECTION, SOLUTION EPIDURAL; INFILTRATION; INTRACAUDAL; PERINEURAL at 13:33

## 2020-01-14 RX ADMIN — ROCURONIUM BROMIDE 20 MG: 10 SOLUTION INTRAVENOUS at 13:52

## 2020-01-14 RX ADMIN — MIDAZOLAM 2 MG: 1 INJECTION INTRAMUSCULAR; INTRAVENOUS at 13:26

## 2020-01-14 RX ADMIN — HYDROMORPHONE HYDROCHLORIDE 2 MG: 2 TABLET ORAL at 15:35

## 2020-01-14 RX ADMIN — SUCCINYLCHOLINE CHLORIDE 200 MG: 20 INJECTION, SOLUTION INTRAMUSCULAR; INTRAVENOUS at 13:33

## 2020-01-14 RX ADMIN — DEXAMETHASONE SODIUM PHOSPHATE 4 MG: 4 INJECTION, SOLUTION INTRAMUSCULAR; INTRAVENOUS at 13:33

## 2020-01-14 RX ADMIN — FENTANYL CITRATE 50 MCG: 50 INJECTION, SOLUTION INTRAMUSCULAR; INTRAVENOUS at 13:26

## 2020-01-14 RX ADMIN — NEOSTIGMINE METHYLSULFATE 3 MG: 1 INJECTION, SOLUTION INTRAMUSCULAR; INTRAVENOUS; SUBCUTANEOUS at 15:02

## 2020-01-14 RX ADMIN — PHENYLEPHRINE HYDROCHLORIDE 40 MCG/MIN: 10 INJECTION INTRAVENOUS at 13:42

## 2020-01-14 RX ADMIN — SODIUM CHLORIDE, POTASSIUM CHLORIDE, SODIUM LACTATE AND CALCIUM CHLORIDE: 600; 310; 30; 20 INJECTION, SOLUTION INTRAVENOUS at 13:23

## 2020-01-14 NOTE — ROUTINE PROCESS
Patient: Karissa Browne MRN: [id number]  SSN: [id number] YOB: 1955  Age: 71 y.o. Sex: male Patient is status post Procedure(s): RIGID PROCTOSIGMOIDOSCOPY, HEMORRHOIDECTOMY. Surgeon(s) and Role: * Demario Summers MD - Primary Local/Dose/Irrigation: 20ml 0.25% marcaine with epi injected total at the end of surgery. Airway - Endotracheal Tube 01/14/20 (Active) Dressing/Packing:  Wound Anus-Dressing Type: 4 x 4;ABD pad;Xeroform; Other (Comment)(surgifoam absorbably gelatin sponge x3 by Dr. Jose Don.) (01/14/20 1300)

## 2020-01-14 NOTE — DISCHARGE INSTRUCTIONS
Post-Operative Instructions      1. Diet:  Consume a high fiber diet as tolerated. Such a diet may include fresh fruits, vegetables, and whole grain cereals and breads. You should also drink 8-10 glasses of water, juice, or other non-alcoholic beverages per day. 2. Fiber Supplements:  Take 1 dose of Metamucil or other over-the-counter fiber supplement (Citrucel, BeneFiber, etc.) as directed each morning and another dose each evening. 3. Medications:         DILAUDID 2 MG PO GIVEN IN RECOVERY ROOM TODAY AT 15:35      4. Take prescription pain medication (hydromorphone; Dilaudid) as prescribed. Do not drive, drink alcohol, or operate machinery while taking the hydromorphone. Take docusate (non-prescription stool softener) twice per day as directed. Beginning today, take maximum doses of Tylenol (1000 mg every 6 hours) until you do not need pain medication anymore. Doing so will help you to manage the pain and to use less of the hydromorphone. Beginning on the third day after surgery, you may take ibuprofen as directed in addition to or instead of the prescription medication if there has been no significant bleeding. Do not take pain medication on an empty stomach. Do not take aspirin for the next 48 hours. 5. Activity:  Do not engage in any heavy lifting or other strenuous activity until you have been seen for follow-up. You may walk as much as you want, and you may climb stairs. Frequent walking will help prevent constipation. 6. Sitz Baths (soaking):  Remove the dressing on the first day after surgery or just before your next bowel movement (whichever comes first), then begin performing sitz baths 3 times per day and after bowel movements. The water should be very warm, and you should soak for no longer than 20 minutes at a time. Do not wipe the anal area with dry toilet tissue; instead, use baby wipes. After sitz baths, cover the anal area with a gauze dressing.   You may also apply Neosporin, Neosporin + Pain Relief, or a non-prescription topical anesthetic such as lidocaine or Nupercainal ointment. 7. Constipation:  If more than one day passes without a bowel movement, take 1 Tablespoon of Milk of Magnesia. Repeat in 6 hours if you have no results. If taking the second dose of the Milk of Magnesia does not result in a bowel movement, take two to four 5 mg Dulcolax (bisacodyl) tablets. If you have not had a bowel movement by the next morning after taking the bisacodyl, call my office. 8. Bleeding:  A small amount of bright red bleeding can be expected for the next several days. If bleeding appears to be continuous, call my office. 9. Other Problems:  If you experience intolerable pain, fever (temperature of 101 or more), or inability to urinate, call my office. 10. Questions: If you have any questions about your post-operative condition or care, do not hesitate to call my office. 11. Work:  You may return to work in two weeks. 11.  Other: For anything other than scheduling, please call [phone]. 12.  Follow-up:  Please return to my office at 11:30 AM on Monday 1/27/2020. If you need to change the appointment, please call [phone] on a weekday between 9:00 AM and noon. Faizan Gary M.D.  [phone]          ______________________________________________________________________    Anesthesia Discharge Instructions    After general anesthesia or intervenous sedation, for 24 hours or while taking prescription Narcotics:  · Limit your activities  · Do not drive or operate hazardous machinery  · If you have not urinated within 8 hours after discharge, please contact your surgeon on call.   · Do not make important personal or business decisions  · Do not drink alcoholic beverages    Report the following to your surgeon:  · Excessive pain, swelling, redness or odor of or around the surgical area  · Temperature over 100.5 degrees  · Nausea and vomiting lasting longer than 4 hours or if unable to take medication  · Any signs of decreased circulation or nerve impairment to extremity:  Change in color, persistent numbness, tingling, coldness or increased pain.   · Any questions

## 2020-01-14 NOTE — ANESTHESIA POSTPROCEDURE EVALUATION
Post-Anesthesia Evaluation and Assessment    Patient: Karissa Browne MRN: [id number]  SSN: [id number]    YOB: 1955  Age: 71 y.o. Sex: male      I have evaluated the patient and they are stable and ready for discharge from the PACU. Cardiovascular Function/Vital Signs  Visit Vitals  /67   Pulse 63   Temp 36.7 °C (98 °F)   Resp 11   Ht 6' 2\" (1.88 m)   Wt 89.8 kg (198 lb)   SpO2 97%   BMI 25.42 kg/m²       Patient is status post General anesthesia for Procedure(s):  RIGID PROCTOSIGMOIDOSCOPY, HEMORRHOIDECTOMY. Nausea/Vomiting: None    Postoperative hydration reviewed and adequate. Pain:  Pain Scale 1: Numeric (0 - 10) (01/14/20 1549)  Pain Intensity 1: 0 (01/14/20 1549)   Managed    Neurological Status:   Neuro (WDL): Within Defined Limits (01/14/20 1218)   At baseline    Mental Status, Level of Consciousness: Alert and  oriented to person, place, and time    Pulmonary Status:   O2 Device: Room air (01/14/20 1549)   Adequate oxygenation and airway patent    Complications related to anesthesia: None    Post-anesthesia assessment completed. No concerns    Signed By: Nurys Mejia MD     January 14, 2020              Procedure(s):  RIGID PROCTOSIGMOIDOSCOPY, HEMORRHOIDECTOMY. general    <BSHSIANPOST>    Vitals Value Taken Time   /67 1/14/2020  4:00 PM   Temp 36.7 °C (98 °F) 1/14/2020  3:49 PM   Pulse 63 1/14/2020  4:01 PM   Resp 11 1/14/2020  4:01 PM   SpO2 97 % 1/14/2020  4:01 PM   Vitals shown include unvalidated device data.

## 2020-01-14 NOTE — BRIEF OP NOTE
BRIEF OPERATIVE NOTE    Date of Procedure:   1/14/2020  Preoperative Diagnosis:  Hemorrhoids. Postoperative Diagnosis:  Hemorrhoids. Procedure:  Rigid proctosigmoidoscopy and internal and external hemorrhoidectomies. Surgeon:  Sourav Batres MD  Assistant:  Susanna Cain SA  Anesthesia:  General endotracheal  Estimated Blood Loss:  50 mL  Crystalloid:  300 mL  Specimens:   ID Type Source Tests Collected by Time Destination   Hemorrhoids Fresh Rectal  Viry Guzman MD 1/14/2020 1412 Pathology     Tubes and Drains:  None. Findings:  Enlarged external hemorrhoids and skin tags with associated internal components. Otherwise normal rigid proctosigmoidoscopy to 18 cm from the anal verge. Complications:  None apparent. Implants:  None.

## 2020-01-14 NOTE — H&P
History and Physical (outpatient)    Patient: Von Rueda 71 y.o. male     Chief Complaint:  Hemorrhoids    History of Present Illness: The patient is a 70-year-old male who reports that he has hemorrhoid problems for many years. A few months ago, they began to flare up, and they have continued to bother him quite a bit since then. He appreciates enlargement of external tissue that fluctuates in size, but he does not appreciate any definite protrusion of tissue from the anus. He has trouble with hygiene after bowel movements because of the excess tissue. He usually moves his bowels twice per day without straining. Hi stools are not hard, and he only occasionally sees blood on the toilet paper. He denies experiencing any fecal incontinence or seepage of stools between bowel movements. Examination in the office on 12/20/2019 revealed multiple areas of external hemorrhoidal enlargement without thrombosis. There was engorgement of the external hemorrhoids with straining in the left lateral decubitus position, but there is no protrusion of tissue from the anus. Digital rectal examination revealed grossly normal resting sphincter tone and no masses, and anoscopy revealed somewhat enlarged internal hemorrhoids. History:  Past Medical History:   Diagnosis Date    Adverse effect of anesthesia     REQUIRES A LOT OF ANESTHESIA-AWAKENS EARLY, PATIENT CURSES WHEN AWAKENING    Aneurysm ([address]     ASCENDING AORTIC - REPAIRED 2016    Arthritis     CAD (coronary artery disease)     ECG abnormality 8/1/2017    History of colonic polyps     Polyp removed colonoscopically on 8/24/2015.     Insomnia     Lumbar herniated disc     HERNIATED DISC L7    Murmur     Severe aortic stenosis     Sleep apnea     CPAP STARTED JAN 1, 2020    Valvular heart disease        Past Surgical History:   Procedure Laterality Date    HX COLONOSCOPY  08/24/2015    Dr. Jovan Zamora HX COLONOSCOPY  06/09/2005    JORGE MARTEL 2007    Jaw sugery to align teeth    HX HEENT  10/09/2017    Sinus surgery; Bull Singh MD.   [facility]      Multiple right foot operations.  HX ORTHOPAEDIC  2013    Shoulder surgery; Elaina Carrion MD.    HX OTHER SURGICAL  2016    Aortic valve replacement and resection of ascending aorta; Dr. Yanely He.     HX REFRACTIVE SURGERY  2005       Family History   Problem Relation Age of Onset    Stroke Mother     Heart Failure Mother     Cancer Sister         Non-Hodgkins     Heart Disease Maternal Grandfather     Lung Disease Father         Migel Bolds    Liver Disease Father     Other Brother         aortic valve     Cancer Sister         LUNG    No Known Problems Sister     No Known Problems Sister     No Known Problems Sister     Crohn's Disease Sister     Other Sister         CLEFT PALATE    No Known Problems Brother     MS Brother     No Known Problems Brother     Anesth Problems Neg Hx      Social History     Socioeconomic History    Marital status:      Spouse name: Not on file    Number of children: Not on file    Years of education: Not on file    Highest education level: Not on file   Occupational History    Not on file   Social Needs    Financial resource strain: Not on file    Food insecurity:     Worry: Not on file     Inability: Not on file    Transportation needs:     Medical: Not on file     Non-medical: Not on file   Tobacco Use    Smoking status: Former Smoker     Packs/day: 1.00     Years: 10.00     Pack years: 10.00     Last attempt to quit: 1980     Years since quittin.0    Smokeless tobacco: Never Used   Substance and Sexual Activity    Alcohol use: No     Comment: special occasions    Drug use: No    Sexual activity: Not on file   Lifestyle    Physical activity:     Days per week: Not on file     Minutes per session: Not on file    Stress: Not on file   Relationships    Social connections:     Talks on phone: Not on file     Gets together: Not on file     Attends Hinduism service: Not on file     Active member of club or organization: Not on file     Attends meetings of clubs or organizations: Not on file     Relationship status: Not on file    Intimate partner violence:     Fear of current or ex partner: Not on file     Emotionally abused: Not on file     Physically abused: Not on file     Forced sexual activity: Not on file   Other Topics Concern    Not on file   Social History Narrative    Not on file       Allergies: No Known Allergies    Current Medications:  Cannot display prior to admission medications because the patient has not been admitted in this contact. No current facility-administered medications for this encounter. Current Outpatient Medications   Medication Sig    aspirin delayed-release 81 mg tablet Take 81 mg by mouth daily.  multivitamin (ONE A DAY) tablet Take 1 Tab by mouth daily.  testosterone cypionate (DEPOTESTOTERONE CYPIONATE) 200 mg/mL injection 100 mg by IntraMUSCular route every seven (7) days.  OTHER HCG TABLET - 500IU ONCE WEEKLY ON Sunday EVENINGS    ANASTROZOLE PO Take 0.5 mg by mouth every seven (7) days.  eszopiclone (LUNESTA) 3 mg tablet Take 3 mg by mouth nightly as needed for Sleep.  amoxicillin-clavulanate (AUGMENTIN) 875-125 mg per tablet Take 1 Tab by mouth two (2) times a day. Indications: PRIOR TO DENTAL PROCEDURES    melatonin tab tablet Take 1 Tab by mouth nightly.  ibuprofen (ADVIL) 200 mg tablet Take 200 mg by mouth every six (6) hours as needed. 400mg 1-2 times a day PRN    omega-3 fatty acids-vitamin e (FISH OIL) 1,000 mg cap Take 1 Cap by mouth daily.  glucosamine-chondroitin (ARTHX) 500-400 mg cap Take 1 Cap by mouth daily.  B.infantis-B.ani-B.long-B.bifi (PROBIOTIC 4X) 10-15 mg TbEC Take 1 Tab by mouth daily. Physical Exam:  There were no vitals taken for this visit. GENERAL:  No apparent distress.   LUNGS: Clear to auscultation bilaterally. HEART:  Regular rate and rhythm with no murmurs, gallops, or rubs. NEUROLOGIC: Alert and oriented. No gross deficits. Alerts:    Hospital Problems  Date Reviewed: 9/21/2017    None          Laboratory:    No results for input(s): HGB, HCT, WBC, PLT, INR, BUN, CREA, K, CRCLT, HGBEXT, HCTEXT, PLTEXT, INREXT in the last 72 hours. No lab exists for component: PTT, PT    Assessment and Plan:  Hemorrhoidectomy is indicated. The excision of the external disease will be the primary goal; however, as I explained to the patient, there are internal components that are continuous with the external that will most likely need to be dealt with at the same time. Rigid proctosigmoidoscopy is also indicated to evaluate the rectum for other significant pathology. The risks of the operation have been discussed in detail, and the patient has agreed to proceed.

## 2020-01-14 NOTE — PERIOP NOTES
Patient scanned at 336 ml of urine. Taken to PHASE II RESTROOM in attempt to urinate. Patient made aware of need to straight cath as per surgeon's orders,  If unable to void.

## 2022-03-19 PROBLEM — R42 DIZZINESS: Status: ACTIVE | Noted: 2017-08-23

## 2022-03-19 PROBLEM — R41.82 ALTERED MENTAL STATE: Status: ACTIVE | Noted: 2017-08-01

## 2022-03-19 PROBLEM — J01.00 SINUSITIS, ACUTE, MAXILLARY: Status: ACTIVE | Noted: 2017-10-09

## 2022-03-19 PROBLEM — R94.31 ECG ABNORMALITY: Status: ACTIVE | Noted: 2017-08-01

## 2022-04-15 NOTE — ED NOTES
Awaiting diet tray from nutrition services. Hydroxychloroquine Pregnancy And Lactation Text: This medication has been shown to cause fetal harm but it isn't assigned a Pregnancy Risk Category. There are small amounts excreted in breast milk.

## 2023-01-05 NOTE — TELEPHONE ENCOUNTER
Health Maintenance Due   Topic Date Due   • COVID-19 Vaccine (4 - Booster for Pfizer series) 04/01/2022   • Depression Screening  08/19/2022   • Influenza Vaccine (1) 09/01/2022       Patient is due for the topics as listed above and wishes to proceed with them. Orders placed for Immunization(s) Influenza.  Vaccine Information Statement(s) for Influenza (Inactivated) given and reviewed, questions answered, verbal consent given by Patient for injection(s) administered. Patient tolerated without incident. See immunization grid for documentation.    1.  Does the patient have a moderate to severe fever?  NO  2.  Has the patient had a serious reaction to a flu shot before?   NO  3.  Has the patient ever had Guillain-Houston Syndrome within 6 weeks of a previous flu shot? NO  4.  Does the patient have a serious allergy to eggs?  NO  5.  Does the patient have an allergy to latex?  NO       Note: This applies to [employer] Fluzone prefilled syringes only  6.  If person is answering for a child, is the child less than 6 months of age? NO    A \"YES\" answer to any question above means the patient is not eligible to receive the vaccine.  Injection/Vaccine  Supervising Provider: paulette arana  Reviewed allergies and possible side effects prior to injection. The patient was held following the injection for 10 minutes. No signs or symptoms of allergic reaction were observed. Patient tolerated procedure well. Educated patient to call office with any questions or concerns.                LMVM to call Gina. (Loop monitor benign).

## 2023-09-08 ENCOUNTER — HOSPITAL ENCOUNTER (EMERGENCY)
Facility: HOSPITAL | Age: 68
Discharge: HOME | End: 2023-09-08
Attending: EMERGENCY MEDICINE
Payer: MEDICARE

## 2023-09-08 VITALS
RESPIRATION RATE: 20 BRPM | DIASTOLIC BLOOD PRESSURE: 78 MMHG | HEART RATE: 59 BPM | SYSTOLIC BLOOD PRESSURE: 115 MMHG | OXYGEN SATURATION: 98 % | TEMPERATURE: 97.7 F

## 2023-09-08 DIAGNOSIS — U07.1 COVID: Primary | ICD-10-CM

## 2023-09-08 LAB
FLUAV AG NPH QL IA: NEGATIVE
FLUBV AG NOSE QL IA: NEGATIVE
SARS-COV-2 RDRP RESP QL NAA+PROBE: DETECTED
SOURCE: ABNORMAL

## 2023-09-08 PROCEDURE — 87804 INFLUENZA ASSAY W/OPTIC: CPT

## 2023-09-08 PROCEDURE — 87635 SARS-COV-2 COVID-19 AMP PRB: CPT

## 2023-09-08 PROCEDURE — 99283 EMERGENCY DEPT VISIT LOW MDM: CPT

## 2023-09-08 NOTE — ED PROVIDER NOTES
\[facility]\"" EMERGENCY DEPT  EMERGENCY DEPARTMENT ENCOUNTER       Pt Name: Savana Henderson  MRN: [id number]  [address] [facility] 1955  Date of Evaluation: 9/8/2023  Provider: Nika Michaels PA-C   PCP: Evie Porter MD  Note Started: 10:55 AM 9/8/23     CHIEF COMPLAINT       Chief Complaint   Patient presents with    Influenza     Requesting COVID test- has flu sx headache, cough congestion sore throat         HISTORY OF PRESENT ILLNESS: 1 or more elements      History From: Patient  None     Savana Henderson is a 70 y.o. male who presents to the ED today with upper respiratory symptoms. Has been experiencing a sore throat, headache, body aches, cough, fever. Came here for COVID test.     Nursing Notes were all reviewed and agreed with or any disagreements were addressed in the HPI. REVIEW OF SYSTEMS      Review of Systems     Positives and Pertinent negatives as per HPI. PAST HISTORY     Past Medical History:  Past Medical History:   Diagnosis Date    Adverse effect of anesthesia     REQUIRES A LOT OF ANESTHESIA-AWAKENS EARLY, PATIENT CURSES WHEN AWAKENING    Aneurysm ([address])     ASCENDING AORTIC - REPAIRED 2016    Arthritis     CAD (coronary artery disease)     ECG abnormality 8/1/2017    History of colonic polyps     Polyp removed colonoscopically on 8/24/2015. Insomnia     Lumbar herniated disc     HERNIATED DISC L7    Murmur     Severe aortic stenosis     Sleep apnea     CPAP STARTED JAN 1, 2020    Valvular heart disease        Past Surgical History:  Past Surgical History:   Procedure Laterality Date    COLONOSCOPY  06/09/2005    COLONOSCOPY  08/24/2015    Dr. Tim Reyes  01/01/2007    Jaw sugery to align teeth    HEENT  10/09/2017    Sinus surgery; Kanchan Whipple MD.    ORTHOPEDIC SURGERY      Multiple right foot operations. ORTHOPEDIC SURGERY  01/28/2013    Shoulder surgery;  Jeannie Jordan MD.    OTHER SURGICAL HISTORY  12/12/2016    Aortic valve replacement and resection of supervision physician was on site and available for consultation if needed. I am the Primary Clinician of Record. Ira Herrera PA-C (electronically signed)    (Please note that parts of this dictation were completed with voice recognition software. Quite often unanticipated grammatical, syntax, homophones, and other interpretive errors are inadvertently transcribed by the computer software. Please disregards these errors.  Please excuse any errors that have escaped final proofreading.)         Ira Herrera PA-C  09/08/23 0231

## 2025-06-27 NOTE — OP NOTES
[address]   OPERATIVE REPORT    Name:  Avtar Don  MR#:  [id number]  :  1955  ACCOUNT #:  [de-identified]    DATE OF SERVICE:  2020    PREOPERATIVE DIAGNOSIS:  Hemorrhoids. POSTOPERATIVE DIAGNOSIS:  Hemorrhoids. PROCEDURE PERFORMED:  Rigid proctosigmoidoscopy and internal and external hemorrhoidectomies. SURGEON:  Kamilla Florence MD    ASSISTANT:  Marcell Pimentel SA    ANESTHESIA:  General endotracheal.    SPECIMENS REMOVED:  None. TUBES AND DRAINS:  None. ESTIMATED BLOOD LOSS:  50 mL. CRYSTALLOID:  300 mL. COMPLICATIONS:  None apparent. IMPLANTS:  None. INDICATION FOR PROCEDURE:  The patient is a 79-year-old male, who reports that he has had hemorrhoid problems for many years. A few months ago, they began to flare up and has continued to bother him quite a bit since then. He appreciates enlargement of external tissue that fluctuates in size, but he does not appreciate any definite protrusion of tissue from the anus. He has trouble with hygiene after bowel movements because of the excess tissue. He has occasionally seen some blood on the toilet paper. He underwent an examination in the office on 2019, and that revealed multiple areas of external hemorrhoidal enlargement without thrombosis. There was engorgement of the external hemorrhoids with straining in the left lateral decubitus position, but there was no protrusion of the tissue from the anus. Digital rectal examination revealed no masses, and anoscopy revealed somewhat enlarged internal hemorrhoids. The patient's last colonoscopy was performed in . Hemorrhoidectomy was indicated for management of the patient's symptoms that were mostly attributable to the external disease. Rigid proctosigmoidoscopy was also indicated to evaluate the rectum for other significant pathology, particularly in light of the occasional bleeding.   The risks of surgery were discussed in detail, Per HIPAA ok to leave detailed message, LM on pt's daughter # asking to obtain those questions she may have.   and the patient agreed to proceed. OPERATIVE FINDINGS:  The were enlarged external hemorrhoids and skin tags with associated internal components. Rigid proctosigmoidoscopy to 18 cm from the anal verge was otherwise normal.    DESCRIPTION OF PROCEDURE:  After informed consent was obtained, the patient was taken to the operating room where standard monitoring devices were attached and adequate general endotracheal anesthesia was induced. He was then placed in the prone jackknife position on the operating table with all pressure points padded appropriately and with the lower extremities fitted with pneumatic compression stockings. 3.375 g of Zosyn was administered parenterally for endocarditis prophylaxis. Digital rectal examination and visualization were both then performed, and the rigid proctosigmoidoscope was lubricated and inserted transanally into the rectum. It was carefully advanced under direct vision to 18 cm from the anal verge and then it was slowly withdrawn while inspection was continued. The findings were negative except for the hemorrhoids. he perineum was prepped and draped in usual sterile fashion. Exposure within the anal canal was facilitated using the large [employer] retractor. The disease was located in multiple areas including left lateral, right posterior, right anterior, and left anterior quadrants. The areas of hemorrhoidal enlargement were dealt with mostly by sharp excision using scalpel and tenotomy scissors. Redundant overlying tissue, skin, and mucosa was trimmed. Hemostasis was achieved by selective application of the electrocautery and wound closure. The wound closure was performed using running and interrupted 3-0 Vicryl sutures. Some hemorrhoids were also suture ligated with 3-0 Vicryl. In all, there were four major areas that were addressed and then some minor areas as well. Final inspection revealed good hemostasis.   0.25% bupivacaine with epinephrine was infiltrated to provide some postoperative analgesia. Three small strips of Gelfoam were inserted into the anal canal, and an external dressing consisting of Xeroform, 4x4's, and ABD was put in place. There were no apparent complications, and the patient appeared to have tolerated the procedure well. At its conclusion, he was extubated and transported in stable condition to the recovery room.         Artis Polanco MD      [id number]  D:  01/15/2020 23:23  T:  01/16/2020 3:07  JOB #:  [id number]  CC:  MD Gayathri Matthew MD

## (undated) DEVICE — INTENDED FOR TISSUE SEPARATION, AND OTHER PROCEDURES THAT REQUIRE A SHARP SURGICAL BLADE TO PUNCTURE OR CUT.: Brand: BARD-PARKER ® CARBON RIB-BACK BLADES

## (undated) DEVICE — INFECTION CONTROL KIT SYS

## (undated) DEVICE — ASTOUND STANDARD SURGICAL GOWN, XXL: Brand: CONVERTORS

## (undated) DEVICE — DBD-PACK,LAPAROTOMY,2 REINFORCED GOWNS: Brand: MEDLINE

## (undated) DEVICE — 4-PORT MANIFOLD: Brand: NEPTUNE 2

## (undated) DEVICE — Device: Brand: PILLOW, GENTLETOUCH, 7 INCH RT

## (undated) DEVICE — REM POLYHESIVE ADULT PATIENT RETURN ELECTRODE: Brand: VALLEYLAB

## (undated) DEVICE — SYRINGE MED 20ML STD CLR PLAS LUERLOCK TIP N CTRL DISP

## (undated) DEVICE — TOWEL SURG W17XL27IN STD BLU COT NONFENESTRATED PREWASHED

## (undated) DEVICE — MASTISOL ADHESIVE LIQ 2/3ML

## (undated) DEVICE — MEDI-VAC NON-CONDUCTIVE SUCTION TUBING: Brand: CARDINAL HEALTH

## (undated) DEVICE — TAPE,CLOTH/SILK,CURAD,3"X10YD,LF,40/CS: Brand: CURAD

## (undated) DEVICE — BLADE ASSEMB CLP HAIR FINE --

## (undated) DEVICE — SURGICAL PROCEDURE PACK BASIN MAJ SET CUST NO CAUT

## (undated) DEVICE — ROCKER SWITCH PENCIL BLADE ELECTRODE, HOLSTER: Brand: EDGE

## (undated) DEVICE — (D)SYR 10ML 1/5ML GRAD NSAF -- PKGING CHANGE USE ITEM 338027

## (undated) DEVICE — DEVON™ KNEE AND BODY STRAP 60" X 3" (1.5 M X 7.6 CM): Brand: DEVON

## (undated) DEVICE — SOLUTION IV 1000ML 0.9% SOD CHL

## (undated) DEVICE — SUTURE CHROMIC GUT SZ 3-0 L27IN ABSRB BRN L19MM FS-2 3/8 636H

## (undated) DEVICE — ELECTRODE NDL 2.8IN COAT VALLEYLAB

## (undated) DEVICE — LIGHT HANDLE: Brand: DEVON

## (undated) DEVICE — GARMENT,MEDLINE,DVT,INT,CALF,MED, GEN2: Brand: MEDLINE

## (undated) DEVICE — SUTURE MCRYL SZ 4-0 L18IN ABSRB UD P-3 L13MM 3/8 CIR PRIM Y494G

## (undated) DEVICE — SPONGE GZ W4XL4IN COT 12 PLY TYP VII WVN C FLD DSGN

## (undated) DEVICE — 2.1MM CROSS CUT FISSURE

## (undated) DEVICE — SPONGE GZ W4XL4IN COT RADPQ HIGHLY ABSRB

## (undated) DEVICE — PAD,ABDOMINAL,5"X9",ST,LF,25/BX: Brand: MEDLINE INDUSTRIES, INC.

## (undated) DEVICE — NEEDLE HYPO 25GA L1.5IN BLU POLYPR HUB S STL REG BVL STR

## (undated) DEVICE — KENDALL SCD EXPRESS SLEEVES, KNEE LENGTH, MEDIUM: Brand: KENDALL SCD

## (undated) DEVICE — NDL PRT INJ NSAF BLNT 18GX1.5 --

## (undated) DEVICE — NEEDLE HYPO 25GA L1.5IN BVL ORIENTED ECLIPSE

## (undated) DEVICE — GLOVE SURG SZ 75 L1212IN FNGR THK138MIL BRN LTX FREE

## (undated) DEVICE — Z CONVERTED USE 2271043 CONTAINER SPEC COLL 4OZ SCR ON LID PEEL PCH

## (undated) DEVICE — SPONGE LAP 18X18IN STRL -- 5/PK

## (undated) DEVICE — 40418 TRENDELENBURG ONE-STEP ARM PROTECTORS LARGE (1 PAIR): Brand: 40418 TRENDELENBURG ONE-STEP ARM PROTECTORS LARGE (1 PAIR)

## (undated) DEVICE — ARGYLE FRAZIER SURGICAL SUCTION INSTRUMENT 10 FR/CH (3.3 MM): Brand: ARGYLE

## (undated) DEVICE — STRAP,POSITIONING,KNEE/BODY,FOAM,4X60": Brand: MEDLINE

## (undated) DEVICE — CURVED, SMALL JAW, OPEN SEALER/DIVIDER: Brand: LIGASURE

## (undated) DEVICE — SYR 10ML LUER LOK 1/5ML GRAD --

## (undated) DEVICE — TRAY PREP DRY W/ PREM GLV 2 APPL 6 SPNG 2 UNDPD 1 OVERWRAP

## (undated) DEVICE — DRESSING,GAUZE,XEROFORM,CURAD,5"X9",ST: Brand: CURAD

## (undated) DEVICE — STERILE POLYISOPRENE POWDER-FREE SURGICAL GLOVES WITH EMOLLIENT COATING: Brand: PROTEXIS

## (undated) DEVICE — POSITIONER HD REST FOAM CMFRT TCH

## (undated) DEVICE — PACK,EENT,TURBAN DRAPE,PK II: Brand: MEDLINE

## (undated) DEVICE — HANDLE LT SNAP ON ULT DURABLE LENS FOR TRUMPF ALC DISPOSABLE

## (undated) DEVICE — SUTURE VCRL SZ 3-0 L27IN ABSRB VLT L26MM SH 1/2 CIR J316H